# Patient Record
Sex: FEMALE | Race: WHITE | NOT HISPANIC OR LATINO | Employment: FULL TIME | URBAN - METROPOLITAN AREA
[De-identification: names, ages, dates, MRNs, and addresses within clinical notes are randomized per-mention and may not be internally consistent; named-entity substitution may affect disease eponyms.]

---

## 2018-03-07 ENCOUNTER — OFFICE VISIT (OUTPATIENT)
Dept: URGENT CARE | Facility: URGENT CARE | Age: 53
End: 2018-03-07
Payer: COMMERCIAL

## 2018-03-07 VITALS
TEMPERATURE: 98 F | WEIGHT: 172.31 LBS | OXYGEN SATURATION: 99 % | DIASTOLIC BLOOD PRESSURE: 70 MMHG | SYSTOLIC BLOOD PRESSURE: 110 MMHG | HEART RATE: 76 BPM

## 2018-03-07 DIAGNOSIS — S46.811A STRAIN OF RIGHT TRAPEZIUS MUSCLE, INITIAL ENCOUNTER: Primary | ICD-10-CM

## 2018-03-07 PROCEDURE — 99203 OFFICE O/P NEW LOW 30 MIN: CPT | Performed by: FAMILY MEDICINE

## 2018-03-07 RX ORDER — LIDOCAINE 50 MG/G
PATCH TOPICAL
Qty: 30 PATCH | Refills: 0 | Status: SHIPPED | OUTPATIENT
Start: 2018-03-07 | End: 2020-05-25

## 2018-03-07 NOTE — LETTER
[facility]  [address] [address]  [phone]          March 7, 2018    RE:  Silvia Humphreys                                                                                                                                                       [address] [address]        To whom it may concern:    Silvia Humphreys was seen at [facility] for  Strain of right trapezius muscle, initial encounter She  may return to work with the following restrictions:  1. No lifting for 1 week (until 3/15/18)   2. No shoveling snow indefinitely     Sincerely,      Polina Wheatley MD

## 2018-03-07 NOTE — MR AVS SNAPSHOT
After Visit Summary   3/7/2018    Silvia Humphreys    MRN: [id number]           Patient Information     Date Of Birth          1965        Visit Information        Provider Department      3/7/2018 7:35 PM Polina Wheatley MD [facility]        Today's Diagnoses     Strain of right trapezius muscle, initial encounter    -  1      Care Instructions    -Lidoderm patch for back pain  -Continue diclofenac and flexeril    Muscle Strain in the Extremities  A muscle strain is a stretching and tearing of muscle fibers. This causes pain, especially when you move that muscle. There may also be some swelling and bruising.  Home care    Keep the hurt area raised to reduce pain and swelling. This is especially important during the first 48 hours.    Apply an ice pack over the injured area for 15 to 20 minutes every 3 to 6 hours. You should do this for the first 24 to 48 hours. You can make an ice pack by filling a plastic bag that seals at the top with ice cubes and then wrapping it with a thin towel. Be careful not to injure your skin with the ice treatments. Ice should never be applied directly to skin. Continue the use of ice packs for relief of pain and swelling as needed. After 48 hours, apply heat (warm shower or warm bath) for 15 to 20 minutes several times a day, or alternate ice and heat.    You may use over-the-counter pain medicine to control pain, unless another medicine was prescribed. If you have chronic liver or kidney disease or ever had a stomach ulcer or GI bleeding, talk with your healthcare provider before using these medicines.    For leg strains: If crutches have been recommended, don t put full weight on the hurt leg until you can do so without pain. You can return to sports when you are able to hop and run on the injured leg without pain.  Follow-up care  Follow up with your healthcare provider, or as advised.  When to seek medical advice  Call your  healthcare provider right away if any of these occur:    The toes of the injured leg become swollen, cold, blue, numb, or tingly    Pain or swelling increases  Date Last Reviewed: 11/19/2015 2000-2017 [employer]. [address] [place] [address], [place] [address]. All rights reserved. This information is not intended as a substitute for professional medical care. Always follow your healthcare professional's instructions.        Self-Care for Strains and Sprains  Most minor strains and sprains can be treated with self-care. Recovering from a strain or sprain may take 6 to 8 weeks. Your self-care goal is to reduce pain and immobilize the injury to speed healing.     A sprain injures ligaments (tissue that connects bones to bones).        A strain injures muscles or tendons (tissue that connects muscles to bones).   Support the injured area  Wrapping the injured area provides support for short, necessary activities. Be careful not to wrap the area too tightly. This could cut off the blood supply.    Support a wrist, elbow, or shoulder with a sling.    Wrap an ankle or knee with an elastic bandage.    Tape a finger or toe to the one next to it.  Use cold and heat  Cold reduces swelling. Both cold and heat reduce pain. Heat should not be used in the initial treatment of the injury. When using cold or heat, always place a towel between the pack and your skin.    Apply ice or a cold pack 10 to 15 minutes every hour you re awake for the first 2 days.    After the swelling goes down, use cold or heat to control pain. Don t use heat late in the day, since it can cause swelling when you re not active.  Rest and elevate  Rest and elevation help your injury heal faster.    Raise the injured area above your heart level.    Keep the injured area from moving.    Limit the use of the joint or limb.  Use medicine    Aspirin reduces pain and swelling. (Note: Don t give aspirin to a child 18 or younger unless prescribed by the  doctor.)    Aspirin substitutes, such as ibuprofen, can reduce pain. Some substitutes reduce swelling, too. Ask your pharmacist which substitutes you can use.  Call your doctor if:    The injured joint won t move, or bones make a grating sound when they move.    You can t put weight on the injured area, even after 24 hours.    The injured body part is cold, blue, or numb.    The joint or limb appears bent or crooked.    Pain increases or doesn t improve in 4 days.    When pressing along the injured area, you notice a spot that is especially painful.   Date Last Reviewed: 9/29/2015 2000-2017 [employer]. [address] [place] [address]. All rights reserved. This information is not intended as a substitute for professional medical care. Always follow your healthcare professional's instructions.                Follow-ups after your visit        Follow-up notes from your care team     Return if symptoms worsen or fail to improve.      Who to contact     If you have questions or need follow up information about today's clinic visit or your schedule please contact [facility] directly at [phone].  Normal or non-critical lab and imaging results will be communicated to you by Rayneerhart, letter or phone within 4 business days after the clinic has received the results. If you do not hear from us within 7 days, please contact the clinic through Rayneerhart or phone. If you have a critical or abnormal lab result, we will notify you by phone as soon as possible.  Submit refill requests through CloudJay or call your pharmacy and they will forward the refill request to us. Please allow 3 business days for your refill to be completed.          Additional Information About Your Visit        CloudJay Information     CloudJay lets you send messages to your doctor, view your test results, renew your prescriptions, schedule appointments and more. To sign up, go to  "[email or web address] . Click on \"Log in\" on the left side of the screen, which will take you to the Welcome page. Then click on \"Sign up Now\" on the right side of the page.     You will be asked to enter the access code listed below, as well as some personal information. Please follow the directions to create your username and password.     Your access code is: [email or web address]  Expires: 2018  8:27 PM     Your access code will  in 90 days. If you need help or a new code, please call your [facility] or [phone].        Care EveryWhere ID     This is your Care EveryWhere ID. This could be used by other organizations to access your [facility] medical records  [id number]        Your Vitals Were     Pulse Temperature Pulse Oximetry             76 98  F (36.7  C) (Tympanic) 99%          Blood Pressure from Last 3 Encounters:   18 110/70    Weight from Last 3 Encounters:   18 172 lb 5 oz (78.2 kg)              Today, you had the following     No orders found for display         Today's Medication Changes          These changes are accurate as of 3/7/18  8:27 PM.  If you have any questions, ask your nurse or doctor.               Start taking these medicines.        Dose/Directions    lidocaine 5 % Patch   Commonly known as:  LIDODERM   Used for:  Strain of right trapezius muscle, initial encounter        Apply up to 3 patches to painful area at once for up to 12 h within a 24 h period.  Remove after 12 hours.   Quantity:  30 patch   Refills:  0            Where to get your medicines      These medications were sent to [facility] - [place], [place] - [address] AT [facility] [address], [facility] [address]    Hours:  24-hours Phone:  [phone]     lidocaine 5 % Patch                Primary Care Provider Office Phone # Fax #    Veda Godfrey [phone]       [facility] [address]  [place] [address]      "   Equal Access to Services     [facility] : Hadii moi patel ceci Salinasali, waletyda luqadaha, qaleonilata kaverocarl gay. So [facility] [phone].    ATENCIÓN: Si habla español, tiene a pearce disposición servicios gratuitos de asistencia lingüística. Korinaame al [phone].    We comply with applicable federal civil rights laws and [place] laws. We do not discriminate on the basis of race, color, national origin, age, disability, sex, sexual orientation, or gender identity.            Thank you!     Thank you for choosing [facility]  for your care. Our goal is always to provide you with excellent care. Hearing back from our patients is one way we can continue to improve our services. Please take a few minutes to complete the written survey that you may receive in the mail after your visit with us. Thank you!             Your Updated Medication List - Protect others around you: Learn how to safely use, store and throw away your medicines at www.disposemymeds.org.          This list is accurate as of 3/7/18  8:27 PM.  Always use your most recent med list.                   Brand Name Dispense Instructions for use Diagnosis    celeXA 40 MG tablet   Generic drug:  citalopram      Take 40 mg by mouth daily.        CYCLOBENZAPRINE HCL PO      Take 10 mg by mouth 2 times daily as needed for muscle spasms        DICLOFENAC PO      Take 75 mg by mouth 2 times daily        levothyroxine 50 MCG tablet    SYNTHROID/LEVOTHROID     Take 50 mcg by mouth daily.        lidocaine 5 % Patch    LIDODERM    30 patch    Apply up to 3 patches to painful area at once for up to 12 h within a 24 h period.  Remove after 12 hours.    Strain of right trapezius muscle, initial encounter

## 2018-03-08 NOTE — PROGRESS NOTES
[facility]  [address] [place] [address]  [phone]         SUBJECTIVE       Silvia Humphreys is a 52 year old  female with a PMH significant for:   There is no problem list on file for this patient.    She presents with right shoulder and upper back pain.    She had pain starting 1.5 weeks ago after she did heavy shoveling of the snow. Otherwise, she can't remember a specific inciting event. No fall or direct injury.  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 She also did a lot of shoveling after the heavy snow at the group home.     The pain started getting worse the last few days and did a lot of activity at work so she went to her primary care clinic in [place] yesterday. She was prescribed flexeril and diclofenac with limited relief. The pain feels on her right upper shoulder and right upper back and front of chest. It feels achy, burning always there. Movement aggravates it. Lying down helps. She has tried ice and heat. No numbness, tingling or weakness. No dropping objects of extremity weakness. No other areas of pain. No fevers, vision problems, or headaches.    Past Medical History:  No past medical history on file.    Past Surgical History:  No past surgical history on file.    Family History:  No family history on file.    Social History:  Social History     Social History     Marital status: Single     Spouse name: N/A     Number of children: N/A     Years of education: N/A     Occupational History     Not on file.     Social History Main Topics     Smoking status: Current Every Day Smoker     Smokeless tobacco: Not on file     Alcohol use No     Drug use: Not on file     Sexual activity: Not on file     Other Topics Concern     Not on file     Social History Narrative     No narrative on file       Medications:   Current Outpatient Prescriptions   Medication Sig Dispense Refill      DICLOFENAC PO Take 75 mg by mouth 2 times daily       CYCLOBENZAPRINE HCL PO Take 10 mg by mouth 2 times daily as needed for muscle spasms       lidocaine (LIDODERM) 5 % Patch Apply up to 3 patches to painful area at once for up to 12 h within a 24 h period.  Remove after 12 hours. 30 patch 0     levothyroxine (SYNTHROID, LEVOTHROID) 50 MCG tablet Take 50 mcg by mouth daily.       citalopram (CELEXA) 40 MG tablet Take 40 mg by mouth daily.         Allergies:     Allergies   Allergen Reactions     Aspirin [Dihydroxyaluminum Aminoacetate]      Augmentin      Latex      No Clinical Screening - See Comments      cantaloupe     Wasps [Hornets]      And bee       PMH, Surgical Hx, Family Hx, Social Hx, Medications and Allergies were reviewed and updated as needed.        REVIEW OF SYSTEMS     Complete ROS negative except as mentioned above        OBJECTIVE     Vitals:    03/07/18 1955 03/07/18 2011   BP: (!) 88/60 110/70   Pulse: 76    Temp: 98  F (36.7  C)    TempSrc: Tympanic    SpO2: 99%    Weight: 172 lb 5 oz (78.2 kg)      There is no height or weight on file to calculate BMI.    Constitutional: Awake, alert, cooperative, no apparent distress, and appears stated age.  Neck: Supple, symmetrical, trachea midline, no adenopathy, thyroid symmetric, not enlarged and no tenderness, skin normal. No spinous process tenderness. Able to flex, extend, turn head right left but noted achiness with movement.   Hematologic / Lymphatic: No cervical lymphadenopathy and no supraclavicular lymphadenopathy.  Back: Symmetric, no curvature, spinous processes are non-tender on palpation, paraspinous muscles are non-tender on palpation except along the upper border of right trapezius muscle,, no costal vertebral tenderness.  Lungs: No increased work of breathing, good air exchange, clear to auscultation bilaterally, no crackles or wheezing.  Cardiovascular: Regular rate and rhythm, normal S1 and S2, no S3 or S4, and no murmur noted. Radial  pulses 2+  Musculoskeletal: No redness, warmth, or swelling of the joints. Arms symmetric.  Full range of motion noted including abduction, adduction, external rotation, and internal rotation of upper extremity. She notes generalized discomfort with any right arm movement along shoulder. Negative Neer's and Valentin.   Motor strength is 5 out of 5 all extremities bilaterally.  Tone is normal.  Neurologic: Awake, alert, oriented to name, place and time. .  Motor is 5 out of 5 bilaterally.  Sensation intact.   Neuropsychiatric: Normal affect, mood, orientation, memory and insight.  Skin: No rashes, erythema, pallor, petechia or purpura.      ASSESSMENT AND PLAN     Silvia Humphreys is a 52 year old  female who presents for right shoulder and upper back pain.     Strain of right trapezius muscle: Suspect secondary to overuse from heavy snow shoveling. Neurovascularly intact with no focal bony tenderness. Will continue treatment as prescribed by her PCP with NSAID, muscle relaxant, and add on topical agent. Return precautions discussed with patient. After patient left at the end of clinic, received phone call from pharmacy that lidoderm patch is not covered by her insurance so instruction given to pharmacy if she cannot afford to pay out of pocket, acceptable alternatives are salonpas or capsaicin cream.   -Continue diclofenac and flexeril as prescribed by PCP  -Heat and ice  -Work letter provided  - lidocaine (LIDODERM) 5 % Patch; Apply up to 3 patches to painful area at once for up to 12 h within a 24 h period.  Remove after 12 hours.  Dispense: 30 patch; Refill: 0    RTC  as needed if develops new or worsening symptoms.    Options for treatment and/or follow-up care were reviewed with the patient who was engaged and actively involved in the decision making process and verbalized understanding of the options discussed and was satisfied with the final plan.    Polina Wheatley MD PGY-3  Pager: [phone]

## 2018-03-08 NOTE — PATIENT INSTRUCTIONS
-Lidoderm patch for back pain  -Continue diclofenac and flexeril    Muscle Strain in the Extremities  A muscle strain is a stretching and tearing of muscle fibers. This causes pain, especially when you move that muscle. There may also be some swelling and bruising.  Home care    Keep the hurt area raised to reduce pain and swelling. This is especially important during the first 48 hours.    Apply an ice pack over the injured area for 15 to 20 minutes every 3 to 6 hours. You should do this for the first 24 to 48 hours. You can make an ice pack by filling a plastic bag that seals at the top with ice cubes and then wrapping it with a thin towel. Be careful not to injure your skin with the ice treatments. Ice should never be applied directly to skin. Continue the use of ice packs for relief of pain and swelling as needed. After 48 hours, apply heat (warm shower or warm bath) for 15 to 20 minutes several times a day, or alternate ice and heat.    You may use over-the-counter pain medicine to control pain, unless another medicine was prescribed. If you have chronic liver or kidney disease or ever had a stomach ulcer or GI bleeding, talk with your healthcare provider before using these medicines.    For leg strains: If crutches have been recommended, don t put full weight on the hurt leg until you can do so without pain. You can return to sports when you are able to hop and run on the injured leg without pain.  Follow-up care  Follow up with your healthcare provider, or as advised.  When to seek medical advice  Call your healthcare provider right away if any of these occur:    The toes of the injured leg become swollen, cold, blue, numb, or tingly    Pain or swelling increases  Date Last Reviewed: 11/19/2015 2000-2017 [employer]. [address] [place] [address], [place] [address]. All rights reserved. This information is not intended as a substitute for professional medical care. Always follow your healthcare  professional's instructions.        Self-Care for Strains and Sprains  Most minor strains and sprains can be treated with self-care. Recovering from a strain or sprain may take 6 to 8 weeks. Your self-care goal is to reduce pain and immobilize the injury to speed healing.     A sprain injures ligaments (tissue that connects bones to bones).        A strain injures muscles or tendons (tissue that connects muscles to bones).   Support the injured area  Wrapping the injured area provides support for short, necessary activities. Be careful not to wrap the area too tightly. This could cut off the blood supply.    Support a wrist, elbow, or shoulder with a sling.    Wrap an ankle or knee with an elastic bandage.    Tape a finger or toe to the one next to it.  Use cold and heat  Cold reduces swelling. Both cold and heat reduce pain. Heat should not be used in the initial treatment of the injury. When using cold or heat, always place a towel between the pack and your skin.    Apply ice or a cold pack 10 to 15 minutes every hour you re awake for the first 2 days.    After the swelling goes down, use cold or heat to control pain. Don t use heat late in the day, since it can cause swelling when you re not active.  Rest and elevate  Rest and elevation help your injury heal faster.    Raise the injured area above your heart level.    Keep the injured area from moving.    Limit the use of the joint or limb.  Use medicine    Aspirin reduces pain and swelling. (Note: Don t give aspirin to a child 18 or younger unless prescribed by the doctor.)    Aspirin substitutes, such as ibuprofen, can reduce pain. Some substitutes reduce swelling, too. Ask your pharmacist which substitutes you can use.  Call your doctor if:    The injured joint won t move, or bones make a grating sound when they move.    You can t put weight on the injured area, even after 24 hours.    The injured body part is cold, blue, or numb.    The joint or limb appears  bent or crooked.    Pain increases or doesn t improve in 4 days.    When pressing along the injured area, you notice a spot that is especially painful.   Date Last Reviewed: 9/29/2015 2000-2017 [employer]. [address] [place] [address], [place] [address]. All rights reserved. This information is not intended as a substitute for professional medical care. Always follow your healthcare professional's instructions.

## 2019-12-18 ENCOUNTER — OFFICE VISIT (OUTPATIENT)
Dept: FAMILY MEDICINE | Facility: CLINIC | Age: 54
End: 2019-12-18
Payer: COMMERCIAL

## 2019-12-18 VITALS
SYSTOLIC BLOOD PRESSURE: 101 MMHG | TEMPERATURE: 97.6 F | DIASTOLIC BLOOD PRESSURE: 68 MMHG | BODY MASS INDEX: 32.27 KG/M2 | HEIGHT: 64 IN | HEART RATE: 102 BPM | OXYGEN SATURATION: 96 % | WEIGHT: 189 LBS

## 2019-12-18 DIAGNOSIS — J01.10 ACUTE NON-RECURRENT FRONTAL SINUSITIS: Primary | ICD-10-CM

## 2019-12-18 PROCEDURE — 99213 OFFICE O/P EST LOW 20 MIN: CPT | Performed by: PHYSICIAN ASSISTANT

## 2019-12-18 RX ORDER — AZITHROMYCIN 250 MG/1
250 TABLET, FILM COATED ORAL DAILY
Qty: 6 TABLET | Refills: 0 | Status: SHIPPED | OUTPATIENT
Start: 2019-12-18 | End: 2020-05-25

## 2019-12-18 RX ORDER — GUAIFENESIN/DEXTROMETHORPHAN 100-10MG/5
5 SYRUP ORAL 4 TIMES DAILY PRN
Qty: 236 ML | Refills: 0 | Status: SHIPPED | OUTPATIENT
Start: 2019-12-18 | End: 2020-05-25

## 2019-12-18 ASSESSMENT — MIFFLIN-ST. JEOR: SCORE: 1437.3

## 2019-12-19 NOTE — PROGRESS NOTES
SUBJECTIVE:   Tori Humphreys is a 54 year old female presenting with a chief complaint of runny nose, stuffy nose, cough - productive, shortness of breath, sore throat and facial pain/pressure.  Onset of symptoms was 10 day(s) ago.  Course of illness is worsening.    Severity moderate  Treatment measures tried include Tylenol/Ibuprofen, Decongestants, Fluids and Rest.  Predisposing factors include None and seasonal allergies.    No past medical history on file.  Current Outpatient Medications   Medication Sig Dispense Refill     citalopram (CELEXA) 40 MG tablet Take 40 mg by mouth daily.       CYCLOBENZAPRINE HCL PO Take 10 mg by mouth 2 times daily as needed for muscle spasms       DICLOFENAC PO Take 75 mg by mouth 2 times daily       levothyroxine (SYNTHROID, LEVOTHROID) 50 MCG tablet Take 50 mcg by mouth daily.       lidocaine (LIDODERM) 5 % Patch Apply up to 3 patches to painful area at once for up to 12 h within a 24 h period.  Remove after 12 hours. 30 patch 0     Social History     Tobacco Use     Smoking status: Current Every Day Smoker   Substance Use Topics     Alcohol use: No       ROS:  CONSTITUTIONAL:NEGATIVE for fever, chills, change in weight  INTEGUMENTARY/SKIN: NEGATIVE for worrisome rashes, moles or lesions  EYES: NEGATIVE for vision changes or irritation  ENT/MOUTH: See HPI  RESP:NEGATIVE for significant SOB. Positive for cough.  CV: NEGATIVE for chest pain, palpitations or peripheral edema  GI: NEGATIVE for nausea, abdominal pain, heartburn, or change in bowel habits  HEME/ALLERGY/IMMUNE: NEGATIVE for bleeding problems  PSYCHIATRIC: NEGATIVE for changes in mood or affect    OBJECTIVE:  There were no vitals taken for this visit.  GENERAL APPEARANCE: healthy, alert and no distress  EYES: EOMI,  PERRL, conjunctiva clear  HENT: ear canals and TM's normal.  Mouth without ulcers, erythema or lesions. Nasal mucosa is erythematous and with moderate green mucus. Frontal sinuses are moderately tender.    NECK: supple, nontender, no lymphadenopathy  RESP: lungs clear to auscultation - no rales, rhonchi or wheezes  CV: regular rates and rhythm, normal S1 S2, no murmur noted  ABDOMEN:  soft, nontender, no HSM or masses and bowel sounds normal  NEURO: Normal strength and tone, sensory exam grossly normal,  normal speech and mentation  SKIN: no suspicious lesions or rashes    ASSESSMENT/PLAN:    (J01.10) Acute non-recurrent frontal sinusitis  (primary encounter diagnosis)  Plan: azithromycin (ZITHROMAX) 250 MG tablet,         guaiFENesin-dextromethorphan (ROBITUSSIN DM)         100-10 MG/5ML syrup    Age 6-12:   Okay to use Children Motrin OTC    Adults:  Okay to take acetaminophen 500 mg- 2 tabs (Total of 1000 mg) every 8 hrs   Okay to take ibuprofen 200 mg- 3 tabs (Total of 600 mg) every 6 hours        Okay to use Neti pot for sinus lavage up to three times daily for congestion and sinus pressure. Daily hot shower can be beneficial for congestion and body aches. Okay to use bedroom vaporizer or humidifier if symptoms are worse at night. Nightly Vicks Vapor rub okay to use for sleep.     OTC cough medication and decongestants okay if not prescribed by me during this visit.       Okay to use salt water gargles and lozenges for throat discomfort.     Patient will need to get plenty of rest and drink at least 1.5-2 liters of fluids daily for adults and 1-1.5 liters for children. If vomiting and not tolerating liquids for more than 24 hrs, please go to your nearest emergency department for IV fluids and further treatment.     Patient is not contagious after 1 week from start of symptoms. If possible, wear mask for first 7 days. Wash hands regularly and vigorously for 30 seconds often.     Patient was advised to return to clinic if symptoms do not improve in the amount of time specified in the AVS or if symptoms worsen. Patient educated on red flag symptoms and asked to go directly to the ED if symptoms present themselves.        Rajan Palacio PA-C on 12/18/2019 at 8:01 PM

## 2020-05-03 ENCOUNTER — VIRTUAL VISIT (OUTPATIENT)
Dept: FAMILY MEDICINE | Facility: OTHER | Age: 55
End: 2020-05-03

## 2020-05-04 NOTE — PROGRESS NOTES
"Date: 2020 20:14:31  Clinician: Heavenly Gordon  Clinician NPI: [id number]  Patient: Tori Humphreys  Patient : 1965  Patient Address: [address] [place] [address]  Patient Phone: [phone]  Visit Protocol: URI  Patient Summary:  Tori is a 54 year old ( : 1965 ) female who initiated a Visit for COVID-19 (Coronavirus) evaluation and screening. When asked the question \"Please sign me up to receive news, health information and promotions from [employer].\", Tori responded \"Yes\".    Her symptoms consist of myalgia and ear pain. She is experiencing difficulty breathing due to nasal congestion but she is not short of breath.   Tori denies having fever, rhinitis, facial pain or pressure, sore throat, cough, nasal congestion, vomiting, nausea, teeth pain, ageusia, anosmia, diarrhea, headache, malaise, wheezing, and chills. She also denies taking antibiotic medication for the symptoms and having recent facial or sinus surgery in the past 60 days.   Precipitating events  She has recently been exposed to someone with influenza. Tori has been in close contact with the following high risk individuals: adults 65 or older, people with asthma, heart disease or diabetes, pregnant women, and immunocompromised people.   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 She provides direct patient care. 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   Tori has had a close contact with a laboratory-confirmed COVID-19 patient within 14 days of symptom onset. She also has had a close contact with a suspected COVID-19 patient within 14 days of symptom onset. She was exposed at her work. Additional information about contact with COVID-19 " (Coronavirus) patient as reported by the patient (free text): Co-worker 5 days,  and also patient 3 days   Pertinent medical history  Tori does not get yeast infections when she takes antibiotics.   Tori needs a return to work/school note.   Weight: 170 lbs   Tori does not smoke or use smokeless tobacco.   Additional information as reported by the patient (free text): My sister runs the group home I work at and she is in [facility] with Covid 19 and all the 4 women I work with are exposed, 1 already has just been diagnosed as well. I need to be tested as I work with vulnerable disabled adults and we have all been exposed from my sister.   Weight: 170 lbs  A synchronous phone visit was initiated by the provider for the following reason: Call to clarify duration    MEDICATIONS: cyclobenzaprine oral, pramipexole oral, Celexa oral, ibuprofen-oxycodone oral, levothyroxine oral, ALLERGIES: Augmentin, Latex, Natural Rubber, cortisone  Clinician Response:  Dear Tori,      Your symptoms show that you may have coronavirus (COVID-19). This illness can cause fever, cough and trouble breathing. Many people get a mild case and get better on their own. Some people can get very sick.   Will I be tested for COVID-19?  We would recommend you be tested for coronavirus. Here is how to get that scheduled:   Call [phone]. Tell them you were referred by [facility] to have a COVID-19 test. You will be scheduled at one of our [facility] testing locations (drive-up). Please have your [facility] visit information ready when you call including your visit ID number to verify you were referred.    How can I protect others in the meantime?  First, stay home and away from others (self-isolate) until:   You've had no fever---and no medicine that reduces fever---for 3 full days (72 hours). And...    Your other symptoms have gotten better. For example, your cough or breathing has improved. And...    At least 7 days have passed since your symptoms  started.   During this time:   Don't go to work, school or anywhere else.     Stay away from others in your home. No hugging, kissing or shaking hands.    Don't let anyone visit.    Cover your mouth and nose with a mask, tissue or wash cloth to avoid spreading germs.    Wash your hands and face often. Use soap and water.   How can I take care of myself?  1.Take Tylenol (acetaminophen) for fever or pain. If you have liver or kidney problems, ask your family doctor if it's okay to take Tylenol.   Adults can take either:    650 mg (two 325 mg pills) every 4 to 6 hours, or...    1,000 mg (two 500 mg pills) every 8 hours as needed.     Note: Don't take more than 3,000 mg in one day.   For children, check the Tylenol bottle for the right dose. The dose is based on the child's age or weight.  2.If you have other health problems (like cancer, heart failure, an organ transplant or severe kidney disease): Call your specialty clinic if you don't feel better in the next 2 days.  3.Know when to call 911: If your breathing is so bad that it keeps you from doing normal activities, call 911 or go to the emergency room. Tell them that you've been staying home and may have COVID-19.  4.Sign up for [employer]. We know it's scary to hear that you might have COVID-19. We want to track your symptoms to make sure you're okay over the next 2 weeks. Please look for an email from [employer]---this is a free, online program that we'll use to keep in touch. To sign up, follow the link in the email. Learn more at [email or web address].  Where can I get more information?  To learn more about COVID-19 and how to care for yourself at home, please visit the CDC website at https://www.cdc.gov/coronavirus/2019-ncov/about/steps-when-sick.html.  For more about your care at [facility], please visit [email or web address]     Diagnosis: Cough  Diagnosis ICD: R05  Triage Notes: I reviewed the patient's history,  verified their identity, and explained the Visit process.    Spoke with patient. Symptoms of cough aches 1 week. No fever, mild sob. Speaks in full sentences   Cough is same.  Synchronous Triage: phone, status: completed, duration: 265 seconds

## 2020-05-25 ENCOUNTER — OFFICE VISIT (OUTPATIENT)
Dept: URGENT CARE | Facility: URGENT CARE | Age: 55
End: 2020-05-25
Payer: COMMERCIAL

## 2020-05-25 VITALS — SYSTOLIC BLOOD PRESSURE: 127 MMHG | DIASTOLIC BLOOD PRESSURE: 77 MMHG | TEMPERATURE: 98.6 F | HEART RATE: 76 BPM

## 2020-05-25 DIAGNOSIS — N39.0 ACUTE UTI: Primary | ICD-10-CM

## 2020-05-25 DIAGNOSIS — R30.0 DYSURIA: ICD-10-CM

## 2020-05-25 LAB
ALBUMIN UR-MCNC: NEGATIVE MG/DL
APPEARANCE UR: CLEAR
BILIRUB UR QL STRIP: NEGATIVE
COLOR UR AUTO: YELLOW
GLUCOSE UR STRIP-MCNC: NEGATIVE MG/DL
HGB UR QL STRIP: NEGATIVE
KETONES UR STRIP-MCNC: NEGATIVE MG/DL
LEUKOCYTE ESTERASE UR QL STRIP: NEGATIVE
NITRATE UR QL: NEGATIVE
NON-SQ EPI CELLS #/AREA URNS LPF: NORMAL /LPF
PH UR STRIP: 5 PH (ref 5–7)
RBC #/AREA URNS AUTO: NORMAL /HPF
SOURCE: NORMAL
SP GR UR STRIP: 1.01 (ref 1–1.03)
SPECIMEN SOURCE: NORMAL
UROBILINOGEN UR STRIP-ACNC: 0.2 EU/DL (ref 0.2–1)
WBC #/AREA URNS AUTO: NORMAL /HPF
WET PREP SPEC: NORMAL

## 2020-05-25 PROCEDURE — 87210 SMEAR WET MOUNT SALINE/INK: CPT | Performed by: PHYSICIAN ASSISTANT

## 2020-05-25 PROCEDURE — 81001 URINALYSIS AUTO W/SCOPE: CPT | Performed by: FAMILY MEDICINE

## 2020-05-25 PROCEDURE — 99213 OFFICE O/P EST LOW 20 MIN: CPT | Performed by: PHYSICIAN ASSISTANT

## 2020-05-25 RX ORDER — SULFAMETHOXAZOLE/TRIMETHOPRIM 800-160 MG
1 TABLET ORAL 2 TIMES DAILY
Qty: 14 TABLET | Refills: 0 | Status: SHIPPED | OUTPATIENT
Start: 2020-05-25 | End: 2020-06-01

## 2020-05-25 NOTE — PROGRESS NOTES
Patient presents with:  Urgent Care: RUQ abdominal pain radiating to back, burning pain when urinating and urinary frequency for over one week.     SUBJECTIVE:   Tori Humphreys is a 54 year old female who  presents today for a possible UTI. Symptoms of dysuria, urgency and frequency have been going on for 1week(s).  Some urgency.  Right sided mid and low back discomfort as well.  Has also noticed some right mid lateral abdomen discomfort.  Denies any nausea or vomiting.  Appetite is normal.   Hematuria no.  gradual onset and moderate.  There is no history of fever, chills, nausea or vomiting.  No history of vaginal or penile discharge. This patient does have a history of urinary tract infections.  Patient denies long duration, rigors, flank pain, temperature > 101 degrees F. 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      Took pyridium and is drinking     No past medical history on file.  There is no problem list on file for this patient.    Social History     Tobacco Use     Smoking status: Current Every Day Smoker   Substance Use Topics     Alcohol use: No       ROS:   CONSTITUTIONAL:NEGATIVE for fever, chills, change in weight  INTEGUMENTARY/SKIN: NEGATIVE for worrisome rashes, moles or lesions  EYES: NEGATIVE for vision changes or irritation  ENT/MOUTH: NEGATIVE for ear, mouth and throat problems  RESP:NEGATIVE for significant cough or SOB  CV: NEGATIVE for chest pain, palpitations or peripheral edema  GI: NEGATIVE for nausea, heartburn, or change in bowel habits  : as per HPI  MUSCULOSKELETAL: NEGATIVE for significant arthralgias or myalgia  NEURO: NEGATIVE for weakness, dizziness or paresthesias  ENDOCRINE: NEGATIVE for temperature intolerance, skin/hair changes  Review of systems negative except as stated above.    OBJECTIVE:  /77   Pulse 76   Temp 98.6  F (37  C) (Tympanic)   GENERAL APPEARANCE: healthy, alert and  no distress  RESP: lungs clear to auscultation - no rales, rhonchi or wheezes  CV: regular rates and rhythm, normal S1 S2, no murmur noted  ABDOMEN:  soft, nontender, no HSM or masses and bowel sounds normal  BACK: No CVA tenderness  SKIN: no suspicious lesions or rashes    Results for orders placed or performed in visit on 05/25/20   UA with Microscopic reflex to Culture     Status: None    Specimen: Midstream Urine   Result Value Ref Range    Color Urine Yellow     Appearance Urine Clear     Glucose Urine Negative NEG^Negative mg/dL    Bilirubin Urine Negative NEG^Negative    Ketones Urine Negative NEG^Negative mg/dL    Specific Gravity Urine 1.010 1.003 - 1.035    pH Urine 5.0 5.0 - 7.0 pH    Protein Albumin Urine Negative NEG^Negative mg/dL    Urobilinogen Urine 0.2 0.2 - 1.0 EU/dL    Nitrite Urine Negative NEG^Negative    Blood Urine Negative NEG^Negative    Leukocyte Esterase Urine Negative NEG^Negative    Source Midstream Urine     WBC Urine 0 - 5 OTO5^0 - 5 /HPF    RBC Urine O - 2 OTO2^O - 2 /HPF    Squamous Epithelial /LPF Urine Few FEW^Few /LPF   Wet prep     Status: None    Specimen: Vagina   Result Value Ref Range    Specimen Description Vagina     Wet Prep No Trichomonas seen     Wet Prep No clue cells seen     Wet Prep No yeast seen     Wet Prep WBC'S seen  Few         (N39.0) Acute UTI  (primary encounter diagnosis)  Comment:   Plan: sulfamethoxazole-trimethoprim (BACTRIM DS)         800-160 MG tablet            (R30.0) Dysuria  Comment:   Plan: UA with Microscopic reflex to Culture, Wet prep

## 2020-05-25 NOTE — PATIENT INSTRUCTIONS
(N39.0) Acute UTI  (primary encounter diagnosis)  Comment:   Plan: sulfamethoxazole-trimethoprim (BACTRIM DS)         800-160 MG tablet            (R30.0) Dysuria  Comment:   Plan: UA with Microscopic reflex to Culture, Wet prep            Follow up with primary clinic should symptoms persist or worsen

## 2021-10-01 ENCOUNTER — ANCILLARY PROCEDURE (OUTPATIENT)
Dept: GENERAL RADIOLOGY | Facility: CLINIC | Age: 56
End: 2021-10-01
Attending: PHYSICIAN ASSISTANT
Payer: COMMERCIAL

## 2021-10-01 ENCOUNTER — OFFICE VISIT (OUTPATIENT)
Dept: URGENT CARE | Facility: URGENT CARE | Age: 56
End: 2021-10-01
Payer: COMMERCIAL

## 2021-10-01 VITALS
OXYGEN SATURATION: 98 % | RESPIRATION RATE: 20 BRPM | HEART RATE: 88 BPM | SYSTOLIC BLOOD PRESSURE: 120 MMHG | DIASTOLIC BLOOD PRESSURE: 80 MMHG

## 2021-10-01 DIAGNOSIS — M25.532 LEFT WRIST PAIN: ICD-10-CM

## 2021-10-01 DIAGNOSIS — M25.532 LEFT WRIST PAIN: Primary | ICD-10-CM

## 2021-10-01 DIAGNOSIS — S69.92XA WRIST INJURY, LEFT, INITIAL ENCOUNTER: ICD-10-CM

## 2021-10-01 PROCEDURE — 99213 OFFICE O/P EST LOW 20 MIN: CPT | Performed by: PHYSICIAN ASSISTANT

## 2021-10-01 PROCEDURE — 73110 X-RAY EXAM OF WRIST: CPT | Mod: LT | Performed by: RADIOLOGY

## 2021-10-01 RX ORDER — HYDROCODONE BITARTRATE AND ACETAMINOPHEN 5; 325 MG/1; MG/1
1 TABLET ORAL EVERY 6 HOURS PRN
Qty: 18 TABLET | Refills: 0 | Status: SHIPPED | OUTPATIENT
Start: 2021-10-01 | End: 2021-10-04

## 2021-10-01 NOTE — LETTER
[facility] [address] [address]  [phone]        2021    REPORT OF WORK ABILITY    PATIENT DATA  Employee Name: Tori Humphreys        : 1965   [id number]  Work related injury: YES  Today's date: 2021  Date of injury: 10/1/2021     PROVIDER EVALUATION: Please fill in as needed.  Please give copy to employee for employer.  1. Diagnosis: left wrist injury, pain  2. Treatment: RICE treatment, medication, bracing  3. Medication: vicodin  NOTE: When ordering a medication, [place] Rules require Work Comp or WC on prescriptions.  4. Return to work date: No work from 10/1-10/11 due to being unable to drive and use either hand.  Follow up with PCP in the next week for recheck.      RESTRICTIONS: Unlimited unless listed.  Restrictions apply to home and leisure also.  If work within restrictions is not available, the employee is totally disabled.  Provider comments:   Medical Examiner: Rajan Aguillon [facility] PA-C          Next appointment: 1 week    CC: Employer, Managed Care Plan/Payor, Patient

## 2021-10-05 NOTE — PROGRESS NOTES
Assessment & Plan     Left wrist pain  Wrist xray Negative for acute findings, read by Rajan MORAES at time of visit.  DME wear brace for stabilization and immobilization  vicodin for pain  - XR Wrist Left G/E 3 Views; Future  - Wrist/Arm/Hand Supplies Order for DME - ONLY FOR DME  - HYDROcodone-acetaminophen (NORCO) 5-325 MG tablet; Take 1 tablet by mouth every 6 hours as needed for pain    Wrist injury, left, initial encounter  RICE treatment: Rest, Ice, compression, elevation   Wear brace  Follow up with PCP   - Wrist/Arm/Hand Supplies Order for DME - ONLY FOR DME  - HYDROcodone-acetaminophen (NORCO) 5-325 MG tablet; Take 1 tablet by mouth every 6 hours as needed for pain    Review of external notes as documented elsewhere in note         Tobacco Cessation:   reports that she has been smoking. She has never used smokeless tobacco.      No follow-ups on file.    Rajan Aguillon PA-C  [facility] BRANDEN Valle is a 55 year old who presents for the following health issues     HPI     Left wrist pain  Tenderness     Review of Systems   Constitutional, HEENT, cardiovascular, pulmonary, gi and gu systems are negative, except as otherwise noted.      Objective    /80   Pulse 88   Resp 20   SpO2 98%   There is no height or weight on file to calculate BMI.  Physical Exam   GENERAL: healthy, alert and no distress  MS: Positive for left wrist tenderness, localzied  SKIN: no suspicious lesions or rashes  NEURO: Normal strength and tone, mentation intact and speech normal  PSYCH: mentation appears normal, affect normal/bright    Wrist xray Negative for acute findings, read by Rajan MORAES at time of visit.

## 2021-10-16 ENCOUNTER — OFFICE VISIT (OUTPATIENT)
Dept: URGENT CARE | Facility: URGENT CARE | Age: 56
End: 2021-10-16
Payer: COMMERCIAL

## 2021-10-16 VITALS
TEMPERATURE: 98.8 F | HEART RATE: 98 BPM | OXYGEN SATURATION: 96 % | DIASTOLIC BLOOD PRESSURE: 78 MMHG | SYSTOLIC BLOOD PRESSURE: 126 MMHG | BODY MASS INDEX: 31.24 KG/M2 | RESPIRATION RATE: 20 BRPM | WEIGHT: 182 LBS

## 2021-10-16 DIAGNOSIS — R05.9 COUGH: ICD-10-CM

## 2021-10-16 DIAGNOSIS — R07.0 THROAT PAIN: ICD-10-CM

## 2021-10-16 DIAGNOSIS — J01.90 ACUTE SINUSITIS WITH SYMPTOMS > 10 DAYS: Primary | ICD-10-CM

## 2021-10-16 LAB
DEPRECATED S PYO AG THROAT QL EIA: NEGATIVE
GROUP A STREP BY PCR: NOT DETECTED
SARS-COV-2 RNA RESP QL NAA+PROBE: NEGATIVE

## 2021-10-16 PROCEDURE — U0005 INFEC AGEN DETEC AMPLI PROBE: HCPCS | Performed by: NURSE PRACTITIONER

## 2021-10-16 PROCEDURE — 87651 STREP A DNA AMP PROBE: CPT | Performed by: NURSE PRACTITIONER

## 2021-10-16 PROCEDURE — U0003 INFECTIOUS AGENT DETECTION BY NUCLEIC ACID (DNA OR RNA); SEVERE ACUTE RESPIRATORY SYNDROME CORONAVIRUS 2 (SARS-COV-2) (CORONAVIRUS DISEASE [COVID-19]), AMPLIFIED PROBE TECHNIQUE, MAKING USE OF HIGH THROUGHPUT TECHNOLOGIES AS DESCRIBED BY CMS-2020-01-R: HCPCS | Performed by: NURSE PRACTITIONER

## 2021-10-16 PROCEDURE — 99214 OFFICE O/P EST MOD 30 MIN: CPT | Performed by: NURSE PRACTITIONER

## 2021-10-16 RX ORDER — DOXYCYCLINE 100 MG/1
100 TABLET ORAL 2 TIMES DAILY
Qty: 20 TABLET | Refills: 0 | Status: SHIPPED | OUTPATIENT
Start: 2021-10-16 | End: 2021-10-26

## 2021-10-16 NOTE — PROGRESS NOTES
Chief Complaint   Patient presents with     Cough     with congestion- sx's since 9/29/2021     Pharyngitis         ICD-10-CM    1. Acute sinusitis with symptoms > 10 days  J01.90 doxycycline monohydrate (ADOXA) 100 MG tablet   2. Cough  R05.9 Symptomatic COVID-19 Virus (Coronavirus) by PCR Nose   3. Throat pain  R07.0 Streptococcus A Rapid Scr w Reflx to PCR - Lab Collect     Group A Streptococcus PCR Throat Swab       Patient has been sick for over 2 weeks will treat with antibiotics have her take decongestants push fluids and rest.  If symptoms do not improve she will return for further assessment.    33 minutes total time spent reviewing patient's chart, completing history and physical exam, establishing plan of care, discharge planning and completing documentation.    Results for orders placed or performed in visit on 10/16/21 (from the past 24 hour(s))   Streptococcus A Rapid Scr w Reflx to PCR - Lab Collect    Specimen: Throat; Swab   Result Value Ref Range    Group A Strep antigen Negative Negative       Subjective     Tori Humphreys is an 55 year old female who presents to clinic today for congestion , cough, headaches, feels hot, sore throat, diarrhea, body aches for 2 weeks. Some symptoms have come and gone, but she still feels quite sick overall.     ROS: 10 point ROS neg other than the symptoms noted above in the HPI.       Objective    /78   Pulse 98   Temp 98.8  F (37.1  C)   Resp 20   Wt 82.6 kg (182 lb)   SpO2 96%   BMI 31.24 kg/m      Physical Exam       GENERAL APPEARANCE: alert and mild distress     EYES: PERRL, EOMI, sclera non-icteric     HENT: ear canals and TM's normal, nose and mouth without ulcers or lesions and maxillary and frontal sinuses are tender     NECK: no adenopathy or asymmetry, thyroid normal to palpation     RESP: lungs clear to auscultation - no rales, rhonchi or wheezes     CV: regular rates and rhythm, no murmurs, rubs, or gallop     MS: extremities normal- no  gross deformities noted; normal muscle tone.     SKIN: no suspicious lesions or rashes    Patient Instructions   Use Netti pot twice a day for the next 3-5 days. Use distilled water and the packets of powder included with the pot.    Take Sudafed (Pseudoephredine) 30mg every 6 hours while awake for the next 3-5 days. Do not take this if you have a history of high blood pressure or take medications for high blood pressure.    Take Tylenol or Ibuprofen as needed for fever or pain.    Drink Plenty of water and rest.        LUISA Elmore, CNP  [place] [facility] Provider

## 2022-01-25 ENCOUNTER — OFFICE VISIT (OUTPATIENT)
Dept: URGENT CARE | Facility: URGENT CARE | Age: 57
End: 2022-01-25
Payer: COMMERCIAL

## 2022-01-25 VITALS
DIASTOLIC BLOOD PRESSURE: 86 MMHG | WEIGHT: 190 LBS | HEART RATE: 99 BPM | BODY MASS INDEX: 32.61 KG/M2 | OXYGEN SATURATION: 98 % | SYSTOLIC BLOOD PRESSURE: 157 MMHG

## 2022-01-25 DIAGNOSIS — H10.13 ALLERGIC CONJUNCTIVITIS, BILATERAL: Primary | ICD-10-CM

## 2022-01-25 PROCEDURE — 99213 OFFICE O/P EST LOW 20 MIN: CPT | Performed by: PHYSICIAN ASSISTANT

## 2022-01-25 NOTE — PATIENT INSTRUCTIONS
Patient was educated on the natural course of condition  Conservative measures discussed including continue over-the-counter antihistamines (Zyrtec or Allegra). See your primary care provider if symptoms worsen or do not improve in 7 days. Seek emergency care if you develop difficulty swallowing or difficulty breathing. .

## 2022-01-25 NOTE — PROGRESS NOTES
URGENT CARE VISIT:    SUBJECTIVE:   Tori Humphreys is a 56 year old female who presents complaining of moderate both eyes burning, watery, itching for 1 month(s).  Onset/timing was gradual. Associated signs and symptoms include runny nose. She denies vision changes, headache, sore throat, dry cough, fever, chills and sinus and nasal congestion. She has tried Visine and antibiotic eye drops with no relief of symptoms.  Patient does not wear contacts. She has history of seasonal allergies.     PMH: History reviewed. No pertinent past medical history.  Allergies: Aspirin [dihydroxyaluminum aminoacetate], Augmentin, Latex, Unknown [no clinical screening - see comments], and Wasps [hornets]  Medications:   Current Outpatient Medications   Medication Sig Dispense Refill     citalopram (CELEXA) 40 MG tablet Take 40 mg by mouth daily.       ketotifen (ZADITOR) 0.025 % ophthalmic solution Place 1 drop into both eyes 2 times daily 5 mL 0     levothyroxine (SYNTHROID, LEVOTHROID) 50 MCG tablet Take 50 mcg by mouth daily.       Social History:   Social History     Socioeconomic History     Marital status:      Spouse name: Not on file     Number of children: Not on file     Years of education: Not on file     Highest education level: Not on file   Occupational History     Not on file   Tobacco Use     Smoking status: Current Every Day Smoker     Smokeless tobacco: Never Used   Substance and Sexual Activity     Alcohol use: No     Drug use: Not on file     Sexual activity: Not on file   Other Topics Concern     Not on file   Social History Narrative     Not on file     Social Determinants of Health     Financial Resource Strain: Not on file   Food Insecurity: Not on file   Transportation Needs: Not on file   Physical Activity: Not on file   Stress: Not on file   Social Connections: Not on file   Intimate Partner Violence: Not on file   Housing Stability: Not on file       ROS: ROS otherwise found to be negative except as  noted above.    OBJECTIVE:  BP (!) 157/86 (BP Location: Right arm, Patient Position: Sitting, Cuff Size: Adult Regular)   Pulse 99   Wt 86.2 kg (190 lb)   SpO2 98%   BMI 32.61 kg/m    General: WDWN in NAD.   Head: normocephalic, atraumatic   Eyes: bilateral mild conjunctival injection. Eyes are watery.  Nose: No rhinorrhea.  Ears: Bilateral TMs are easily visualized without erythema, injection, or effusion. Scarring present on right TM. Fluid behind Left TM. No erythema or edema of external canals.   Oropharynx: Mildly erythematous oropharynx. No posterior pharyngeal erythema or exudate.  No oral lesions.  Uvula is midline without erythema or edema.  Cardiac: RRR without murmurs, rubs, or gallops.  Respiratory: LCTAB without adventitious sounds. Non-labored breathing.  Lymph nodes: no cervical adenopathy.  Skin: no rashes or lesions    ASSESSMENT:     ICD-10-CM    1. Allergic conjunctivitis, bilateral  H10.13 ketotifen (ZADITOR) 0.025 % ophthalmic solution        PLAN:  Patient Instructions   Patient was educated on the natural course of condition  Conservative measures discussed including continue over-the-counter antihistamines (Zyrtec or Allegra). See your primary care provider if symptoms worsen or do not improve in 7 days. Seek emergency care if you develop difficulty swallowing or difficulty breathing. .     Patient verbalized understanding and is agreeable to plan. The patient was discharged ambulatory and in stable condition.    Alaina Vines PA-C on 1/25/2022 at 3:39 PM

## 2022-02-15 ENCOUNTER — ANCILLARY PROCEDURE (OUTPATIENT)
Dept: GENERAL RADIOLOGY | Facility: CLINIC | Age: 57
End: 2022-02-15
Attending: PHYSICIAN ASSISTANT
Payer: COMMERCIAL

## 2022-02-15 ENCOUNTER — OFFICE VISIT (OUTPATIENT)
Dept: URGENT CARE | Facility: URGENT CARE | Age: 57
End: 2022-02-15
Payer: COMMERCIAL

## 2022-02-15 VITALS
SYSTOLIC BLOOD PRESSURE: 162 MMHG | BODY MASS INDEX: 32.44 KG/M2 | OXYGEN SATURATION: 97 % | RESPIRATION RATE: 20 BRPM | TEMPERATURE: 100.5 F | HEART RATE: 98 BPM | WEIGHT: 189 LBS | DIASTOLIC BLOOD PRESSURE: 88 MMHG

## 2022-02-15 DIAGNOSIS — S99.912A INJURY OF LEFT ANKLE, INITIAL ENCOUNTER: ICD-10-CM

## 2022-02-15 DIAGNOSIS — Z20.822 SUSPECTED COVID-19 VIRUS INFECTION: Primary | ICD-10-CM

## 2022-02-15 DIAGNOSIS — M25.472 LEFT ANKLE SWELLING: ICD-10-CM

## 2022-02-15 PROCEDURE — U0003 INFECTIOUS AGENT DETECTION BY NUCLEIC ACID (DNA OR RNA); SEVERE ACUTE RESPIRATORY SYNDROME CORONAVIRUS 2 (SARS-COV-2) (CORONAVIRUS DISEASE [COVID-19]), AMPLIFIED PROBE TECHNIQUE, MAKING USE OF HIGH THROUGHPUT TECHNOLOGIES AS DESCRIBED BY CMS-2020-01-R: HCPCS | Performed by: PHYSICIAN ASSISTANT

## 2022-02-15 PROCEDURE — 73610 X-RAY EXAM OF ANKLE: CPT | Mod: LT | Performed by: RADIOLOGY

## 2022-02-15 PROCEDURE — 99214 OFFICE O/P EST MOD 30 MIN: CPT | Performed by: PHYSICIAN ASSISTANT

## 2022-02-15 PROCEDURE — U0005 INFEC AGEN DETEC AMPLI PROBE: HCPCS | Performed by: PHYSICIAN ASSISTANT

## 2022-02-15 RX ORDER — HYDROCODONE BITARTRATE AND ACETAMINOPHEN 5; 325 MG/1; MG/1
1 TABLET ORAL EVERY 6 HOURS PRN
Qty: 10 TABLET | Refills: 0 | Status: SHIPPED | OUTPATIENT
Start: 2022-02-15 | End: 2022-02-18

## 2022-02-15 NOTE — PROGRESS NOTES
Assessment & Plan     Suspected COVID-19 virus infection  covid pending  Check my chart for results  - Symptomatic; Unknown COVID-19 Virus (Coronavirus) by PCR Nose    Injury of left ankle, initial encounter  Ankle xray Negative for acute findings, read by Rajan Aguillon PA-C [facility] at time of visit.  RICE treatment: Rest, Ice, compression, elevation   OTC motrin  vicodin for breakthrough pain  DME ankle brace, crutches  - XR Ankle Left G/E 3 Views; Future  - Ankle/Foot Bracing Supplies Order for DME - ONLY FOR DME  - HYDROcodone-acetaminophen (NORCO) 5-325 MG tablet; Take 1 tablet by mouth every 6 hours as needed for severe pain  - Crutches Order for DME - ONLY FOR DME    Left ankle swelling  RICE treatment: Rest, Ice, compression, elevation   Motrin  Return to activity as tolerated  - XR Ankle Left G/E 3 Views; Future  - Ankle/Foot Bracing Supplies Order for DME - ONLY FOR DME  - Crutches Order for DME - ONLY FOR DME      30 minutes spent on the date of the encounter doing chart review, history and exam, documentation and further activities per the note       Tobacco Cessation:   reports that she has been smoking. She has never used smokeless tobacco.    Follow up with PCP as needed    No follow-ups on file.    Rajan Aguillon PA-C  [facility] BRANDEN Valle is a 56 year old who presents for the following health issues     HPI     Left ankle injury  Mild ankle swelling    Review of Systems   Constitutional, HEENT, cardiovascular, pulmonary, gi and gu systems are negative, except as otherwise noted.      Objective    BP (!) 162/88   Pulse 98   Temp (!) 100.5  F (38.1  C)   Resp 20   Wt 85.7 kg (189 lb)   SpO2 97%   BMI 32.44 kg/m    Body mass index is 32.44 kg/m .  Physical Exam   GENERAL: healthy, alert and no distress  MS: Positive for left lateral ankle tenderness, localized swelling  SKIN: no suspicious lesions or rashes  NEURO: Normal strength and tone, mentation intact and  speech normal    Results for orders placed or performed in visit on 02/15/22   XR Ankle Left G/E 3 Views     Status: None    Narrative    EXAM: XR ANKLE LEFT G/E 3 VIEWS  LOCATION: [facility]  DATE/TIME: 2/15/2022 5:56 PM    INDICATION:  Injury of left ankle, initial encounter, Left ankle swelling  COMPARISON: None.      Impression    IMPRESSION: No acute fracture or dislocation. Small chronic appearing bone fragment adjacent to the distal tip of the lateral malleolus. Mild lateral ankle soft tissue swelling. Plantar calcaneal spur.

## 2022-02-16 LAB — SARS-COV-2 RNA RESP QL NAA+PROBE: NEGATIVE

## 2022-02-16 NOTE — PATIENT INSTRUCTIONS
Patient Education     Ankle Sprain (Adult)    An ankle sprain is a stretching or tearing of the ligaments that hold the ankle joint together. There are no broken bones.  An ankle sprain is a common injury for both children and adults. It happens when the ankle turns, twists, or rolls in an awkward way. This can be caused by a sports injury. Or it can happen from doing something as simple as stepping on an uneven surface.  Ligaments are made of tough connective tissue. Normally, ligaments stretch a certain amount and then go back to their normal place. A sprain happens when a ligament is forced to stretch more than the normal amount. A severe sprain can actually tear the ligaments. If you have a severe sprain, you may have felt or heard something like a pop when you were injured.  Ankle sprains are given a grade depending on whether they are mild, moderate, or severe:    Grade 1 sprain. A mild sprain with minor stretching and damage to the ligament.    Grade 2 sprain. A moderate sprain where the ligament is partly torn.    Grade 3 sprain. The most severe kind of sprain. The ligament is completely torn.  Most sprains take about 4 to 6 weeks to heal. A severe sprain can take several months to recover.  Your healthcare provider may order X-rays to be sure you don t have a fracture, or broken bone.  The injured area will feel sore. Swelling and pain may make it hard to walk. You may need crutches if walking is painful. Or your provider may have you use a cast boot or air splint. This will depend on the grade of ankle sprain that you have.  Home care    For a Grade 1 sprain, use RICE (rest, ice, compression, and elevation):    Rest your ankle. Don t walk on it.    Ice should be used right away to help control swelling. Place an ice pack over the injured area for 20 minutes. Do this every 3 to 6 hours for the first 24 to 48 hours. Keep using ice packs to ease pain and swelling as needed. To make an ice pack, put ice  cubes in a plastic bag that seals at the top. Wrap the bag in a clean, thin towel or cloth. Never put ice or an ice pack directly on the skin. The ice pack can be put right on the cast, bandage, or splint. As the ice melts, be careful that the cast, bandage, or splint doesn t get wet. If you have a boot, open it to apply an ice pack, unless told otherwise by your provider.    Compression devices help to control swelling. They also keep the ankle from moving and support your injured ankle. These devices include dressings, bandages, and wraps.    Elevate or raise your ankle above the level of your heart when sitting or lying down. This is very important for the first 48 hours.    Follow the RICE guidelines for a Grade 2 sprain. This type of sprain will take longer to heal. Your provider may have you wear a splint, cast, or brace to keep your ankle from moving.     If you have a Grade 3 sprain, you are at risk for long-term ankle instability. In rare cases, surgery may be needed. Your provider may have you wear a short leg cast or a walking boot for 2 to 3 weeks.    After 48 hours, it may be helpful to apply heat for 20 minutes several times a day. You can do this with a heating pad or warm compress. Or you may want to go back and forth between using ice and heat. Never apply heat directly to the skin. Always wrap the heating pad or warm compress in a clean, thin towel or cloth.    You may use over-the-counter pain medicine (NSAIDS or nonsteroidal anti-inflammatory drugs) to control pain, unless another pain medicine was prescribed. Talk with your provider before using these medicines if you have chronic liver or kidney disease, or have ever had a stomach ulcer or gastrointestinal bleeding.    Follow any rehabilitation exercises your provider gives you. These can help you be more flexible and improve your balance and coordination. This is helpful in preventing long-term ankle problems.  Prevention  To help prevent  ankle sprains, it s important to have good strength, balance, and flexibility. Be sure to:    Always warm up before you exercise or do something very active    Be careful when walking or running on uneven or cracked surfaces    Wear shoes that are in good condition and fit well    Listen to your body s signals to slow down when you are in pain or tired  Follow-up care  Any X-rays you had today don t show any broken bones, breaks, or fractures. Sometimes fractures don t show up on the first X-ray. Bruises and sprains can sometimes hurt as much as a fracture. These injuries can take time to heal completely. If your symptoms don t get better or they get worse, talk with your healthcare provider. You may need a repeat X-ray.  Follow up with your healthcare provider, or as advised. Check for any warning signs listed below.  When to seek medical advice  Call your healthcare provider right away if any of these occur:    Fever of 100.4 F (38 C) or higher, or as directed by your healthcare provider    Chills    The injury doesn t seem to be healing    The swelling comes back    The cast or splint has a bad smell    The plaster cast or splint gets wet or soft    The fiberglass cast or splint gets wet and does not dry for 24 hours    The pain or swelling increases, or redness appears    Your toes become cold, blue, numb, or tingly    The skin is discolored (looks blue, purple, or gray), has blisters, or is irritated    You re-injure your ankle  Jarrod last reviewed this educational content on 5/1/2018 2000-2021 [employer]. All rights reserved. This information is not intended as a substitute for professional medical care. Always follow your healthcare professional's instructions.

## 2022-04-04 ENCOUNTER — OFFICE VISIT (OUTPATIENT)
Dept: URGENT CARE | Facility: URGENT CARE | Age: 57
End: 2022-04-04
Payer: COMMERCIAL

## 2022-04-04 VITALS
SYSTOLIC BLOOD PRESSURE: 118 MMHG | WEIGHT: 190 LBS | TEMPERATURE: 100.3 F | BODY MASS INDEX: 32.61 KG/M2 | HEART RATE: 91 BPM | DIASTOLIC BLOOD PRESSURE: 84 MMHG | OXYGEN SATURATION: 97 %

## 2022-04-04 DIAGNOSIS — J06.9 UPPER RESPIRATORY TRACT INFECTION, UNSPECIFIED TYPE: Primary | ICD-10-CM

## 2022-04-04 DIAGNOSIS — Z87.09 HISTORY OF ASTHMA: ICD-10-CM

## 2022-04-04 DIAGNOSIS — R05.9 COUGH: ICD-10-CM

## 2022-04-04 LAB — SARS-COV-2 RNA RESP QL NAA+PROBE: NEGATIVE

## 2022-04-04 PROCEDURE — U0003 INFECTIOUS AGENT DETECTION BY NUCLEIC ACID (DNA OR RNA); SEVERE ACUTE RESPIRATORY SYNDROME CORONAVIRUS 2 (SARS-COV-2) (CORONAVIRUS DISEASE [COVID-19]), AMPLIFIED PROBE TECHNIQUE, MAKING USE OF HIGH THROUGHPUT TECHNOLOGIES AS DESCRIBED BY CMS-2020-01-R: HCPCS | Performed by: FAMILY MEDICINE

## 2022-04-04 PROCEDURE — U0005 INFEC AGEN DETEC AMPLI PROBE: HCPCS | Performed by: FAMILY MEDICINE

## 2022-04-04 PROCEDURE — 99214 OFFICE O/P EST MOD 30 MIN: CPT | Performed by: FAMILY MEDICINE

## 2022-04-04 RX ORDER — BENZONATATE 100 MG/1
100 CAPSULE ORAL 3 TIMES DAILY PRN
Qty: 21 CAPSULE | Refills: 0 | Status: SHIPPED | OUTPATIENT
Start: 2022-04-04 | End: 2022-04-11

## 2022-04-04 RX ORDER — ALBUTEROL SULFATE 90 UG/1
2 AEROSOL, METERED RESPIRATORY (INHALATION) EVERY 6 HOURS
Qty: 18 G | Refills: 0 | Status: SHIPPED | OUTPATIENT
Start: 2022-04-04 | End: 2022-05-04

## 2022-04-04 RX ORDER — FLUTICASONE PROPIONATE 50 MCG
2 SPRAY, SUSPENSION (ML) NASAL DAILY
Qty: 15.8 ML | Refills: 0 | Status: SHIPPED | OUTPATIENT
Start: 2022-04-04 | End: 2022-05-04

## 2022-04-04 NOTE — PROGRESS NOTES
SUBJECTIVE: Tori Humphreys is a 56 year old female presenting with a chief complaint of fever, nasal congestion, cough  and ha.  Onset of symptoms was 2 day(s) ago.  Current and Associated symptoms: none  Treatment measures tried include inhaler.  Predisposing factors include HX of asthma.    No past medical history on file.  Allergies   Allergen Reactions     Aspirin [Dihydroxyaluminum Aminoacetate]      Augmentin      Latex      Unknown [No Clinical Screening - See Comments]      cantaloupe     Wasps [Hornets]      And bee     Social History     Tobacco Use     Smoking status: Current Every Day Smoker     Smokeless tobacco: Never Used   Substance Use Topics     Alcohol use: No       ROS:  SKIN: no rash  GI: no vomiting    OBJECTIVE:  /84 (BP Location: Right arm, Patient Position: Sitting, Cuff Size: Adult Regular)   Pulse 91   Temp 100.3  F (37.9  C) (Oral)   Wt 86.2 kg (190 lb)   SpO2 97%   BMI 32.61 kg/m  GENERAL APPEARANCE: healthy, alert and no distress  EYES: EOMI,  PERRL, conjunctiva clear  HENT: ear canals and TM's normal.  Nose and mouth without ulcers, erythema or lesions  RESP: lungs clear to auscultation - no rales, rhonchi or wheezes  SKIN: no suspicious lesions or rashes      ICD-10-CM    1. Upper respiratory tract infection, unspecified type  J06.9 fluticasone (FLONASE) 50 MCG/ACT nasal spray   2. Cough  R05.9 Symptomatic; Unknown COVID-19 Virus (Coronavirus) by PCR Nose     benzonatate (TESSALON) 100 MG capsule     albuterol (PROAIR HFA) 108 (90 Base) MCG/ACT inhaler   3. History of asthma  Z87.09 albuterol (PROAIR HFA) 108 (90 Base) MCG/ACT inhaler     Declines Prednisone  Fluids/Rest, f/u if worse/not any better

## 2022-04-05 ENCOUNTER — TELEPHONE (OUTPATIENT)
Dept: NURSING | Facility: CLINIC | Age: 57
End: 2022-04-05
Payer: COMMERCIAL

## 2022-04-05 NOTE — TELEPHONE ENCOUNTER
Patient calls for covid-19 results. She is given negative result. Patient is requesting a message be sent to urgent care provider. She is still continuing to cough frequently with the inhaler and Benzonatate. She is wondering if an antibiotic can be prescribed. Patient reports that provider mentioned it to her yesterday but said would have to wait for covid-19 test result. Patient says that she is allergic to prednisone.    Please advise and contact patient.    Medina Mccrary RN  [facility]

## 2022-04-05 NOTE — TELEPHONE ENCOUNTER
Coronavirus (COVID-19) Notification    Lab Result   Lab test 2019-nCoV rRt-PCR OR SARS-COV-2 PCR    Nasopharyngeal AND/OR Oropharyngeal swab is NEGATIVE for 2019-nCoV RNA [OR] SARS-COV-2 RNA (COVID-19) RNA    Your result was negative. This means that we didn't find the virus that causes COVID-19 in your sample. A test may show negative when you do actually have the virus. This can happen when the virus is in the early stages of infection, before you feel illness symptoms.    If you have symptoms     Stay home and away from others  o For at least 5 days after your symptoms started, AND   o You are fever free for 24 hours (with no medicine that reduces fever), AND  o Your other symptoms are better.    Wear a mask for 10 full days any time you are around others.    If you've been told by a doctor that you were severely ill with COVID-19 or are immunocompromised, you should isolate for at least 10 days.    During this time:    Stay home. Don't go to work, school or anywhere else.     Stay in your own room, including for meals. Use your own bathroom if you can.    Stay away from others in your home. No hugging, kissing or shaking hands. No visitors.    Clean  high touch  surfaces often (doorknobs, counters, handles, etc.). Use a household cleaning spray or wipes. You can find a full list on the EPA website at www.epa.gov/pesticide-registration/list-n-disinfectants-use-against-sars-cov-2.    Cover your mouth and nose with a mask or other face covering to avoid spreading germs.    Wash your hands and face often with soap and water.    Going back to work  Check with your employer for any guidelines to follow for going back to work.    Employers, schools, and daycares: This document serves as formal notice that your employee tested negative for COVID-19, as of the testing date shown above.    If your symptoms worsen or other concerning symptoms, contact PCP, oncare or consider returning to Emergency Dept.    Where can I get  more information?     [facility] [place]: [email or web address]    Coronavirus Basics: [email or web address]/coronavirus/basics.html    [facility] Hotline (137-806-1544)    Medina Mccrary RN

## 2022-04-10 ENCOUNTER — HEALTH MAINTENANCE LETTER (OUTPATIENT)
Age: 57
End: 2022-04-10

## 2022-06-05 ENCOUNTER — HEALTH MAINTENANCE LETTER (OUTPATIENT)
Age: 57
End: 2022-06-05

## 2022-09-16 ENCOUNTER — ANCILLARY PROCEDURE (OUTPATIENT)
Dept: GENERAL RADIOLOGY | Facility: CLINIC | Age: 57
End: 2022-09-16
Attending: PHYSICIAN ASSISTANT
Payer: OTHER MISCELLANEOUS

## 2022-09-16 ENCOUNTER — OFFICE VISIT (OUTPATIENT)
Dept: URGENT CARE | Facility: URGENT CARE | Age: 57
End: 2022-09-16
Payer: OTHER MISCELLANEOUS

## 2022-09-16 VITALS
OXYGEN SATURATION: 95 % | HEART RATE: 86 BPM | BODY MASS INDEX: 32.68 KG/M2 | WEIGHT: 190.4 LBS | TEMPERATURE: 98.4 F | RESPIRATION RATE: 16 BRPM | DIASTOLIC BLOOD PRESSURE: 72 MMHG | SYSTOLIC BLOOD PRESSURE: 121 MMHG

## 2022-09-16 DIAGNOSIS — S69.92XA THUMB INJURY, LEFT, INITIAL ENCOUNTER: Primary | ICD-10-CM

## 2022-09-16 DIAGNOSIS — S69.92XA THUMB INJURY, LEFT, INITIAL ENCOUNTER: ICD-10-CM

## 2022-09-16 PROCEDURE — 99214 OFFICE O/P EST MOD 30 MIN: CPT | Performed by: PHYSICIAN ASSISTANT

## 2022-09-16 PROCEDURE — 73140 X-RAY EXAM OF FINGER(S): CPT | Mod: TC | Performed by: RADIOLOGY

## 2022-09-16 RX ORDER — FERROUS SULFATE 325(65) MG
TABLET ORAL
COMMUNITY
Start: 2021-03-12

## 2022-09-16 RX ORDER — MULTIPLE VITAMINS W/ MINERALS TAB 9MG-400MCG
1 TAB ORAL DAILY
COMMUNITY

## 2022-09-16 RX ORDER — PLECANATIDE 3 MG/1
1 TABLET ORAL
COMMUNITY
Start: 2022-01-31

## 2022-09-16 RX ORDER — VITAMIN B COMPLEX
TABLET ORAL DAILY
COMMUNITY

## 2022-09-16 NOTE — PROGRESS NOTES
"EMP: Mayra  RYAN: 10am  DOI: 9/16/22  ARIANNA: crush and pulled in handicap ramp    SUBJECTIVE:  Chief Complaint   Patient presents with     Thumb Discomfort     \"Smash thumb\". Rated pain 10/10. There is some swelling and brownish color.      Tori Humphreys is a 56 year old female presents with a chief complaint of left finger  first pain, swelling, tenderness and decreased range of motion.  The injury occurred 3 hour(s) ago.   The injury happened while at work. How: crush injury immediate pain.  The patient complained of moderate and severe pain  and has had decreased ROM.  Pain exacerbated by weight-bearing and movement.  Relieved by elevation.  She treated it initially with ice. This is the first time this type of injury has occurred to this patient.     History reviewed. No pertinent past medical history.  Current Outpatient Medications   Medication Sig Dispense Refill     citalopram (CELEXA) 40 MG tablet Take 40 mg by mouth daily.       ferrous sulfate (FEROSUL) 325 (65 Fe) MG tablet        levothyroxine (SYNTHROID, LEVOTHROID) 50 MCG tablet Take 50 mcg by mouth daily.       multivitamin w/minerals (MULTI-VITAMIN) tablet Take 1 tablet by mouth daily       plecanatide (TRULANCE) 3 MG tablet Take 1 tablet by mouth       Vitamin D3 (CHOLECALCIFEROL) 25 mcg (1000 units) tablet Take by mouth daily       albuterol (PROAIR HFA) 108 (90 Base) MCG/ACT inhaler Inhale 2 puffs into the lungs every 6 hours 18 g 0     ketotifen (ZADITOR) 0.025 % ophthalmic solution Place 1 drop into both eyes 2 times daily 5 mL 0     Social History     Tobacco Use     Smoking status: Current Every Day Smoker     Smokeless tobacco: Never Used   Substance Use Topics     Alcohol use: No       ROS:  Review of systems negative except as stated above.    EXAM:   /72 (BP Location: Right arm, Patient Position: Sitting, Cuff Size: Adult Large)   Pulse 86   Temp 98.4  F (36.9  C) (Oral)   Resp 16   Wt 86.4 kg (190 lb 6.4 oz)   SpO2 95%   BMI " 32.68 kg/m    Gen: healthy,alert,no distress  Extremity: Circumduction intact, pain with flexion, swollen, brusing at distal phalanx, no subungual hematoma  EXTREMITIES: peripheral pulses normal  NEURO: Normal strength and tone, sensory exam grossly normal, mentation intact and speech normal    X-ray image initially interpreted in clinic by me in order to rule out fracture/dislocation.  No evidence appreciated.      ASSESSMENT:   (S69.92XA) Thumb injury, left, initial encounter  (primary encounter diagnosis)  Plan: XR Finger Left G/E 2 Views, Orthopedic          Referral, CANCELED: XR Hand Right G/E        3 Views      There are no Patient Instructions on file for this visit.      Follow up early next week  Red flags and emergent follow up discussed, and understood by patient

## 2023-08-26 ENCOUNTER — HEALTH MAINTENANCE LETTER (OUTPATIENT)
Age: 58
End: 2023-08-26

## 2024-01-14 ENCOUNTER — OFFICE VISIT (OUTPATIENT)
Dept: URGENT CARE | Facility: URGENT CARE | Age: 59
End: 2024-01-14
Payer: COMMERCIAL

## 2024-01-14 VITALS
SYSTOLIC BLOOD PRESSURE: 128 MMHG | RESPIRATION RATE: 18 BRPM | WEIGHT: 170 LBS | TEMPERATURE: 99.1 F | OXYGEN SATURATION: 97 % | HEART RATE: 79 BPM | BODY MASS INDEX: 29.18 KG/M2 | DIASTOLIC BLOOD PRESSURE: 67 MMHG

## 2024-01-14 DIAGNOSIS — J06.9 VIRAL URI WITH COUGH: Primary | ICD-10-CM

## 2024-01-14 LAB
DEPRECATED S PYO AG THROAT QL EIA: NEGATIVE
GROUP A STREP BY PCR: NOT DETECTED

## 2024-01-14 PROCEDURE — 87651 STREP A DNA AMP PROBE: CPT

## 2024-01-14 PROCEDURE — 87635 SARS-COV-2 COVID-19 AMP PRB: CPT

## 2024-01-14 PROCEDURE — 99213 OFFICE O/P EST LOW 20 MIN: CPT

## 2024-01-14 NOTE — PROGRESS NOTES
Assessment & Plan     Viral URI with cough  Patient presenting with cough, rhinorrhea, fatigue, and without fever for 4 days duration.  Recently improved ship.  Vitals stable. Exam unremarkable including clear lungs.  COVID pending and Rapid strep negative Will proceed with conservative management. Discussed Tylenol and ibuprofen for discomfort and fever, nasal saline for congestion, and oral decongestants or mucolytics.  Also discussed patient to return if worsening or new concerning symptoms.   - Streptococcus A Rapid Screen w/Reflex to PCR - Clinic Collect  - Group A Streptococcus PCR Throat Swab  - Symptomatic COVID-19 Virus (Coronavirus) by PCR Nose    Return if symptoms worsen or fail to improve.    [facility] BRANDEN Valle is a 58 year old, presenting for the following health issues:  Urgent Care (Sore throat, coughing for a bout 4 days now, tested covid twice at home and its negative )    HPI     Acute Illness  Acute illness concerns: Cough and congestion  Onset/Duration: 4 days   Symptoms:  Fever: No  Chills/Sweats: YES  Headache (location?): YES  Sinus Pressure: YES  Conjunctivitis:  No  Ear Pain: no  Rhinorrhea: YES  Congestion: YES  Sore Throat: YES  Cough: YES  Wheeze: No  Decreased Appetite: No  Nausea: No  Vomiting: No  Diarrhea: No  Dysuria/Freq.: No  Dysuria or Hematuria: No  Fatigue/Achiness: No  Sick/Strep Exposure: No  Therapies tried and outcome: Nyquil and dayquil  Recent cruise.   Hx of asthma.       Review of Systems   Constitutional, HEENT, cardiovascular, pulmonary, gi and gu systems are negative, except as otherwise noted.      Objective    /67   Pulse 79   Temp 99.1  F (37.3  C)   Resp 18   Wt 77.1 kg (170 lb)   SpO2 97%   BMI 29.18 kg/m    Body mass index is 29.18 kg/m .  Physical Exam   GENERAL: healthy, alert and no distress  NECK: no adenopathy, no asymmetry, masses, or scars and thyroid normal to palpation  RESP: lungs  clear to auscultation - no rales, rhonchi or wheezes  CV: regular rate and rhythm, normal S1 S2, no S3 or S4, no murmur, click or rub, no peripheral edema and peripheral pulses strong  ABDOMEN: soft, nontender, no hepatosplenomegaly, no masses and bowel sounds normal  MS: no gross musculoskeletal defects noted, no edema    Results for orders placed or performed in visit on 01/14/24 (from the past 24 hour(s))   Streptococcus A Rapid Screen w/Reflex to PCR - Clinic Collect    Specimen: Throat; Swab   Result Value Ref Range    Group A Strep antigen Negative Negative

## 2024-01-15 LAB — SARS-COV-2 RNA RESP QL NAA+PROBE: NEGATIVE

## 2024-01-16 ENCOUNTER — OFFICE VISIT (OUTPATIENT)
Dept: URGENT CARE | Facility: URGENT CARE | Age: 59
End: 2024-01-16
Payer: MEDICARE

## 2024-01-16 ENCOUNTER — TELEPHONE (OUTPATIENT)
Dept: URGENT CARE | Facility: URGENT CARE | Age: 59
End: 2024-01-16

## 2024-01-16 VITALS
SYSTOLIC BLOOD PRESSURE: 91 MMHG | TEMPERATURE: 98.4 F | WEIGHT: 170 LBS | HEART RATE: 96 BPM | OXYGEN SATURATION: 99 % | BODY MASS INDEX: 29.18 KG/M2 | DIASTOLIC BLOOD PRESSURE: 63 MMHG

## 2024-01-16 DIAGNOSIS — J06.9 VIRAL URI WITH COUGH: Primary | ICD-10-CM

## 2024-01-16 PROBLEM — R25.1 TREMOR: Status: ACTIVE | Noted: 2021-11-30

## 2024-01-16 PROBLEM — K21.9 GERD (GASTROESOPHAGEAL REFLUX DISEASE): Status: ACTIVE | Noted: 2019-10-23

## 2024-01-16 PROBLEM — E78.00 HIGH CHOLESTEROL: Status: ACTIVE | Noted: 2019-04-01

## 2024-01-16 PROBLEM — F10.10 CHRONIC ALCOHOL ABUSE: Status: ACTIVE | Noted: 2017-11-03

## 2024-01-16 PROBLEM — K59.09 CONSTIPATION, CHRONIC: Status: ACTIVE | Noted: 2020-01-02

## 2024-01-16 PROBLEM — R73.03 PREDIABETES: Status: ACTIVE | Noted: 2023-01-09

## 2024-01-16 PROBLEM — R23.2 HOT FLASHES: Status: ACTIVE | Noted: 2020-10-19

## 2024-01-16 PROBLEM — E55.9 VITAMIN D DEFICIENCY: Status: ACTIVE | Noted: 2018-02-09

## 2024-01-16 PROBLEM — E66.9 OBESITY (BMI 30-39.9): Status: ACTIVE | Noted: 2023-01-09

## 2024-01-16 PROBLEM — Z90.3 INTESTINAL MALABSORPTION FOLLOWING GASTRECTOMY: Status: ACTIVE | Noted: 2019-10-23

## 2024-01-16 PROBLEM — K91.2 INTESTINAL MALABSORPTION FOLLOWING GASTRECTOMY: Status: ACTIVE | Noted: 2019-10-23

## 2024-01-16 PROBLEM — Z91.030 BEE STING ALLERGY: Status: ACTIVE | Noted: 2023-12-19

## 2024-01-16 PROCEDURE — 99213 OFFICE O/P EST LOW 20 MIN: CPT | Performed by: NURSE PRACTITIONER

## 2024-01-16 RX ORDER — BENZONATATE 100 MG/1
100 CAPSULE ORAL 3 TIMES DAILY PRN
Qty: 30 CAPSULE | Refills: 0 | Status: SHIPPED | OUTPATIENT
Start: 2024-01-16 | End: 2024-01-26

## 2024-01-16 NOTE — PROGRESS NOTES
ICD-10-CM    1. Viral URI with cough  J06.9 benzonatate (TESSALON) 100 MG capsule      Will add benzonatate.Rest.  Fluids.  Delsym for cough suppression at night.  Mucinex as desired during the day.  Tylenol or ibuprofen as needed for fever or pain.  Recheck in 1 week if symptoms have not improved, sooner if they worsen.  Decongestant as needed.    Red flag warning signs and when to go to the emergency room discussed.  Reviewed potential adverse reactions to medications.      SUBJECTIVE:   Tori Humphreys is a 58 year old female presenting with a chief complaint of cough, nasal congestion, fever, sore throat, hoarseness for 6 days.  She was seen 2 days ago and diagnosed with a viral upper respiratory infection.  She returns now because she is not feeling better and she needs a note to stay home from work.      Review of systems is negative except for as noted in the HPI.    OBJECTIVE  BP 91/63   Pulse 96   Temp 98.4  F (36.9  C) (Tympanic)   Wt 77.1 kg (170 lb)   SpO2 99%   BMI 29.18 kg/m        GENERAL: Alert, mild distress  SKIN: skin is clear, no rash or abnormal pigmentation  HEAD: The head is normocephalic.   EYES: The eyes are normal. The conjunctivae and cornea normal.   NECK: The neck is supple and thyroid is normal, no masses; LYMPH NODES: No adenopathy  HENT: Bilateral tympanic membranes and canals appear normal, nasal passages are swollen with clear rhinorrhea, pharynx appears normal  LUNGS: The lung fields are clear to auscultation, no rales, rhonchi, wheezing or retractions  CV: Rhythm is regular. S1 and S2 are normal. No murmurs.  EXTREMITIES: Symmetric extremities no deformities    Silvia Jordan, APRN, CNP  [facility] Provider    The use of Dragon/RaySat dictation services may have been used to construct the content in this note; any grammatical or spelling errors are non-intentional. Please contact the author of this note directly if you are in need of any clarification.

## 2024-01-16 NOTE — LETTER
January 16, 2024      Tori Humphreys  [address] [address]        To Whom It May Concern:    Tori Humphreys  was seen on 01/16/2024.  Please excuse her  until 01/22/2024 due to illness.        Sincerely,        SOFÍA HOUSE, CNP

## 2024-01-16 NOTE — TELEPHONE ENCOUNTER
Please inform patient an antibiotic is still not indicated.  If she have symptoms longer than 10 days she may return for recheck if still having problems.

## 2024-01-16 NOTE — TELEPHONE ENCOUNTER
Pt. Is still not feeling well and would like to get a letter for work. Pt. Would to get off this whole week and back to work next week.     Jayla Leon, CHRISTY

## 2024-01-16 NOTE — TELEPHONE ENCOUNTER
Received a call from the patient stating she was seen in Urgent Care on 1/14/24. Patient is wondering about her COVID and strep results? Triage RN advised patient both tests came back negative.     Patient is requesting for the  provider to send a script to the pharmacy for an antibiotic? Patient states she continues to have a bad cough along with nasal drainage. Patient feels like the drainage is moving into her chest. Triage RN advised patient she may need to schedule a follow-up appointment with her primary care provider. Patient refused and requested a message be sent to the  provider.     Ph[facility]: [facility] [address]    Will route to provider for review. Please route to the  team to follow-up with the patient.     Thank you,    Can we leave a detailed message on this number? YES  Phone number patient can be reached at: Cell number on file:    Telephone Information:   Mobile [phone]       Felicia Zamudio RN  [facility] Triage

## 2024-01-16 NOTE — PATIENT INSTRUCTIONS
Viral Respiratory Infection: Care Instructions  Overview     A viral respiratory infection is an infection of the nose, sinuses, or throat caused by a virus. Colds and the flu are common types of viral respiratory infections.  The symptoms of a viral respiratory infection often start quickly. They include a fever, sore throat, and runny nose. You may also just not feel well. Or you may not want to eat much.  Most viral infections can be treated with home care. This may include drinking lots of fluids and taking over-the-counter pain medicine. You will probably feel better in 4 to 10 days.  Antibiotics are not used to treat a viral infection. Antibiotics don't kill viruses, so they won't help cure a viral illness.  In some cases, a doctor may prescribe antiviral medicine to help your body fight a serious viral infection.  Follow-up care is a key part of your treatment and safety. Be sure to make and go to all appointments, and call your doctor if you are having problems. It's also a good idea to know your test results and keep a list of the medicines you take.  How can you care for yourself at home?  To prevent dehydration, drink plenty of fluids. Choose water and other clear liquids until you feel better. If you have kidney, heart, or liver disease and have to limit fluids, talk with your doctor before you increase the amount of fluids you drink.  Ask your doctor if you can take an over-the-counter pain medicine, such as acetaminophen (Tylenol), ibuprofen (Advil, Motrin), or naproxen (Aleve). Be safe with medicines. Read and follow all instructions on the label. No one younger than 20 should take aspirin. It has been linked to Reye syndrome, a serious illness.  Be careful when taking over-the-counter cold or flu medicines and Tylenol at the same time. Many of these medicines have acetaminophen, which is Tylenol. Read the labels to make sure that you are not taking more than the recommended dose. Too much  acetaminophen (Tylenol) can be harmful.  Get plenty of rest.  Use saline (saltwater) nasal washes to help keep your nasal passages open and wash out mucus and allergens. You can buy saline nose sprays at a grocery store or drugstore. Follow the instructions on the package. Or you can make your own at home. Add 1 teaspoon of non-iodized salt and 1 teaspoon of baking soda to 2 cups of distilled or boiled and cooled water. Fill a squeeze bottle or neti pot with the nasal wash. Then put the tip into your nostril, and lean over the sink. With your mouth open, gently squirt the liquid. Repeat on the other side.  Use a vaporizer or humidifier to add moisture to your bedroom. Follow the instructions for cleaning the machine.  Do not smoke or allow others to smoke around you. If you need help quitting, talk to your doctor about stop-smoking programs and medicines. These can increase your chances of quitting for good.  When should you call for help?   Call 911 anytime you think you may need emergency care. For example, call if:    You have severe trouble breathing.   Call your doctor now or seek immediate medical care if:    You have a new or higher fever.     Your fever lasts more than 48 hours.     You have trouble breathing.     You have a fever with a stiff neck or a severe headache.     You are sensitive to light.     You feel very sleepy or confused.   Watch closely for changes in your health, and be sure to contact your doctor if:    You do not get better as expected.

## 2024-03-23 ENCOUNTER — HEALTH MAINTENANCE LETTER (OUTPATIENT)
Age: 59
End: 2024-03-23

## 2024-04-29 ENCOUNTER — HOSPITAL ENCOUNTER (EMERGENCY)
Facility: CLINIC | Age: 59
Discharge: HOME | End: 2024-04-30
Attending: STUDENT IN AN ORGANIZED HEALTH CARE EDUCATION/TRAINING PROGRAM | Admitting: STUDENT IN AN ORGANIZED HEALTH CARE EDUCATION/TRAINING PROGRAM
Payer: COMMERCIAL

## 2024-04-29 ENCOUNTER — APPOINTMENT (OUTPATIENT)
Dept: CT IMAGING | Facility: CLINIC | Age: 59
End: 2024-04-29
Attending: STUDENT IN AN ORGANIZED HEALTH CARE EDUCATION/TRAINING PROGRAM
Payer: COMMERCIAL

## 2024-04-29 DIAGNOSIS — R07.9 CHEST PAIN, UNSPECIFIED TYPE: ICD-10-CM

## 2024-04-29 LAB
ANION GAP SERPL CALCULATED.3IONS-SCNC: 14 MMOL/L (ref 7–15)
BASOPHILS # BLD AUTO: 0.1 10E3/UL (ref 0–0.2)
BASOPHILS NFR BLD AUTO: 1 %
BUN SERPL-MCNC: 14 MG/DL (ref 8–23)
CALCIUM SERPL-MCNC: 9.4 MG/DL (ref 8.6–10)
CHLORIDE SERPL-SCNC: 106 MMOL/L (ref 98–107)
CREAT SERPL-MCNC: 0.53 MG/DL (ref 0.51–0.95)
D DIMER PPP FEU-MCNC: 0.6 UG/ML FEU (ref 0–0.5)
DEPRECATED HCO3 PLAS-SCNC: 20 MMOL/L (ref 22–29)
EGFRCR SERPLBLD CKD-EPI 2021: >90 ML/MIN/1.73M2
EOSINOPHIL # BLD AUTO: 0.2 10E3/UL (ref 0–0.7)
EOSINOPHIL NFR BLD AUTO: 3 %
ERYTHROCYTE [DISTWIDTH] IN BLOOD BY AUTOMATED COUNT: 12.3 % (ref 10–15)
GLUCOSE SERPL-MCNC: 74 MG/DL (ref 70–99)
HCT VFR BLD AUTO: 42.2 % (ref 35–47)
HGB BLD-MCNC: 14.7 G/DL (ref 11.7–15.7)
IMM GRANULOCYTES # BLD: 0 10E3/UL
IMM GRANULOCYTES NFR BLD: 1 %
LYMPHOCYTES # BLD AUTO: 3 10E3/UL (ref 0.8–5.3)
LYMPHOCYTES NFR BLD AUTO: 41 %
MCH RBC QN AUTO: 33.3 PG (ref 26.5–33)
MCHC RBC AUTO-ENTMCNC: 34.8 G/DL (ref 31.5–36.5)
MCV RBC AUTO: 96 FL (ref 78–100)
MONOCYTES # BLD AUTO: 0.6 10E3/UL (ref 0–1.3)
MONOCYTES NFR BLD AUTO: 8 %
NEUTROPHILS # BLD AUTO: 3.5 10E3/UL (ref 1.6–8.3)
NEUTROPHILS NFR BLD AUTO: 46 %
NRBC # BLD AUTO: 0 10E3/UL
NRBC BLD AUTO-RTO: 0 /100
PLATELET # BLD AUTO: 240 10E3/UL (ref 150–450)
POTASSIUM SERPL-SCNC: 3.4 MMOL/L (ref 3.4–5.3)
RBC # BLD AUTO: 4.42 10E6/UL (ref 3.8–5.2)
SODIUM SERPL-SCNC: 140 MMOL/L (ref 135–145)
TROPONIN T SERPL HS-MCNC: <6 NG/L
WBC # BLD AUTO: 7.5 10E3/UL (ref 4–11)

## 2024-04-29 PROCEDURE — 99285 EMERGENCY DEPT VISIT HI MDM: CPT | Mod: 25

## 2024-04-29 PROCEDURE — 93005 ELECTROCARDIOGRAM TRACING: CPT

## 2024-04-29 PROCEDURE — 250N000013 HC RX MED GY IP 250 OP 250 PS 637: Performed by: STUDENT IN AN ORGANIZED HEALTH CARE EDUCATION/TRAINING PROGRAM

## 2024-04-29 PROCEDURE — 85379 FIBRIN DEGRADATION QUANT: CPT | Performed by: STUDENT IN AN ORGANIZED HEALTH CARE EDUCATION/TRAINING PROGRAM

## 2024-04-29 PROCEDURE — 93005 ELECTROCARDIOGRAM TRACING: CPT | Mod: 76

## 2024-04-29 PROCEDURE — 250N000011 HC RX IP 250 OP 636: Performed by: STUDENT IN AN ORGANIZED HEALTH CARE EDUCATION/TRAINING PROGRAM

## 2024-04-29 PROCEDURE — G1010 CDSM STANSON: HCPCS

## 2024-04-29 PROCEDURE — 85025 COMPLETE CBC W/AUTO DIFF WBC: CPT | Performed by: STUDENT IN AN ORGANIZED HEALTH CARE EDUCATION/TRAINING PROGRAM

## 2024-04-29 PROCEDURE — 80048 BASIC METABOLIC PNL TOTAL CA: CPT | Performed by: STUDENT IN AN ORGANIZED HEALTH CARE EDUCATION/TRAINING PROGRAM

## 2024-04-29 PROCEDURE — 36415 COLL VENOUS BLD VENIPUNCTURE: CPT | Performed by: STUDENT IN AN ORGANIZED HEALTH CARE EDUCATION/TRAINING PROGRAM

## 2024-04-29 PROCEDURE — 250N000009 HC RX 250: Performed by: STUDENT IN AN ORGANIZED HEALTH CARE EDUCATION/TRAINING PROGRAM

## 2024-04-29 PROCEDURE — 84484 ASSAY OF TROPONIN QUANT: CPT | Performed by: STUDENT IN AN ORGANIZED HEALTH CARE EDUCATION/TRAINING PROGRAM

## 2024-04-29 PROCEDURE — 96374 THER/PROPH/DIAG INJ IV PUSH: CPT | Mod: 59

## 2024-04-29 RX ORDER — ACETAMINOPHEN 500 MG
1000 TABLET ORAL ONCE
Status: COMPLETED | OUTPATIENT
Start: 2024-04-29 | End: 2024-04-29

## 2024-04-29 RX ORDER — IBUPROFEN 200 MG
400 TABLET ORAL ONCE
Status: COMPLETED | OUTPATIENT
Start: 2024-04-29 | End: 2024-04-29

## 2024-04-29 RX ORDER — IOPAMIDOL 755 MG/ML
120 INJECTION, SOLUTION INTRAVASCULAR ONCE
Status: COMPLETED | OUTPATIENT
Start: 2024-04-29 | End: 2024-04-29

## 2024-04-29 RX ORDER — MORPHINE SULFATE 4 MG/ML
4 INJECTION, SOLUTION INTRAMUSCULAR; INTRAVENOUS ONCE
Status: COMPLETED | OUTPATIENT
Start: 2024-04-29 | End: 2024-04-29

## 2024-04-29 RX ADMIN — MORPHINE SULFATE 4 MG: 4 INJECTION, SOLUTION INTRAMUSCULAR; INTRAVENOUS at 22:36

## 2024-04-29 RX ADMIN — IBUPROFEN 400 MG: 200 TABLET, FILM COATED ORAL at 21:44

## 2024-04-29 RX ADMIN — IOPAMIDOL 65 ML: 755 INJECTION, SOLUTION INTRAVENOUS at 22:59

## 2024-04-29 RX ADMIN — ACETAMINOPHEN 1000 MG: 500 TABLET, FILM COATED ORAL at 21:43

## 2024-04-29 RX ADMIN — SODIUM CHLORIDE 80 ML: 9 INJECTION, SOLUTION INTRAVENOUS at 22:59

## 2024-04-29 ASSESSMENT — ACTIVITIES OF DAILY LIVING (ADL)
ADLS_ACUITY_SCORE: 35
ADLS_ACUITY_SCORE: 35

## 2024-04-29 ASSESSMENT — COLUMBIA-SUICIDE SEVERITY RATING SCALE - C-SSRS
6. HAVE YOU EVER DONE ANYTHING, STARTED TO DO ANYTHING, OR PREPARED TO DO ANYTHING TO END YOUR LIFE?: NO
2. HAVE YOU ACTUALLY HAD ANY THOUGHTS OF KILLING YOURSELF IN THE PAST MONTH?: NO
1. IN THE PAST MONTH, HAVE YOU WISHED YOU WERE DEAD OR WISHED YOU COULD GO TO SLEEP AND NOT WAKE UP?: NO

## 2024-04-29 NOTE — Clinical Note
Tori Humphreys was seen and treated in our emergency department on 4/29/2024.  She may return to work on 05/01/2024.       If you have any questions or concerns, please don't hesitate to call.      Henok Zeng MD

## 2024-04-30 VITALS
HEIGHT: 64 IN | RESPIRATION RATE: 18 BRPM | SYSTOLIC BLOOD PRESSURE: 127 MMHG | TEMPERATURE: 98.1 F | WEIGHT: 171.8 LBS | DIASTOLIC BLOOD PRESSURE: 70 MMHG | BODY MASS INDEX: 29.33 KG/M2 | OXYGEN SATURATION: 98 % | HEART RATE: 78 BPM

## 2024-04-30 LAB
ATRIAL RATE - MUSE: 67 BPM
ATRIAL RATE - MUSE: 79 BPM
DIASTOLIC BLOOD PRESSURE - MUSE: NORMAL MMHG
DIASTOLIC BLOOD PRESSURE - MUSE: NORMAL MMHG
INTERPRETATION ECG - MUSE: NORMAL
INTERPRETATION ECG - MUSE: NORMAL
P AXIS - MUSE: 32 DEGREES
P AXIS - MUSE: 41 DEGREES
PR INTERVAL - MUSE: 160 MS
PR INTERVAL - MUSE: 162 MS
QRS DURATION - MUSE: 80 MS
QRS DURATION - MUSE: 88 MS
QT - MUSE: 352 MS
QT - MUSE: 412 MS
QTC - MUSE: 403 MS
QTC - MUSE: 435 MS
R AXIS - MUSE: 29 DEGREES
R AXIS - MUSE: 30 DEGREES
SYSTOLIC BLOOD PRESSURE - MUSE: NORMAL MMHG
SYSTOLIC BLOOD PRESSURE - MUSE: NORMAL MMHG
T AXIS - MUSE: 21 DEGREES
T AXIS - MUSE: 9 DEGREES
VENTRICULAR RATE- MUSE: 67 BPM
VENTRICULAR RATE- MUSE: 79 BPM

## 2024-04-30 NOTE — ED TRIAGE NOTES
Patient reports 2 weeks of ongoing chest pain. Patient states she was diagnosed with bronchitis but continues to have chest pain. Patient reports her cough is better.       Triage Assessment (Adult)       Row Name 04/29/24 2035          Triage Assessment    Airway WDL WDL        Respiratory WDL    Respiratory WDL WDL        Skin Circulation/Temperature WDL    Skin Circulation/Temperature WDL WDL        Cardiac WDL    Cardiac WDL WDL        Peripheral/Neurovascular WDL    Peripheral Neurovascular WDL WDL        Cognitive/Neuro/Behavioral WDL    Cognitive/Neuro/Behavioral WDL WDL

## 2024-04-30 NOTE — ED PROVIDER NOTES
"  History     Chief Complaint:  Chest Pain       The history is provided by the patient.      Tori Humphreys is a 58 year old female with history of cancer (not active), brain injury, hypothyroidism, and chronic alcohol use who presents to the ED for chest pain.The patient reports she has been sick for the last couple weeks and was diagnosed with bronchitis. She states she had one episode of vomiting this morning. She states the pain is worse with walking around, any movement or twisting and with respirations.  Pain is crampy and sharp. She reports she has been taking ibuprofen without relief. She notes she has a history of a blood clot in her leg that traveled to her lung when she was in her 30s; she adds she was on birth control at that time. She denies recent fever, productive cough, leg swelling, leg pain, smoke use, hemoptysis, hormone use, current treatment for cancer, history of surgeries in the last month, or history of trauma in the last month. Pain improved by lying flat in bed.    No history of hypertension, hyperlipidemia, or diabetes.  No cardiac history.       Independent Historian:    None - Patient Only    Review of External Notes:  none    Allergies:  Amoxicillin-Pot Clavulanate  Aspirin [Dihydroxyaluminum Aminoacetate]  Latex  Wasps [Hornets]     Physical Exam   Patient Vitals for the past 24 hrs:   BP Temp Temp src Pulse Resp SpO2 Height Weight   04/29/24 2036 136/78 98.1  F (36.7  C) Oral 84 18 99 % 1.626 m (5' 4\") 77.9 kg (171 lb 12.8 oz)        Physical Exam  GENERAL: Patient appears uncomfortable, but nontoxic.  HEAD: Atraumatic.  NECK: No rigidity  Chest: Reproducible tenderness to palpation over the left-sided chest-patient winces but there is no skin changes, crepitus, or deformity.  CV: RRR, no murmurs rubs or gallops  PULM: CTAB with good aeration; no retractions, rales, rhonchi, or wheezing  ABD: Soft, nontender, nondistended, no guarding  DERM: No rash. Skin warm and dry  Back: No " midline spinal tenderness or deformity.  Reproducible muscle tenderness in the left rhomboid region - patient winces.  No skin changes, crepitus, or deformity.  EXTREMITY: Moving all extremities without difficulty.  Can range and load left shoulder joint without issue.  No calf tenderness or peripheral edema  VASCULAR: Symmetric pulses bilaterally      Emergency Department Course   ECG  ECG interpreted by me.  Time 2120  NSR at 2032. No ST elevation or depression. Normal intervals. Normal axis.   Narrow Q-wave in lead III      Repeat ECG  ECG interpreted by me.  Time 0033  NSR at 67. No ST elevation or depression. Normal intervals. Normal axis.   Narrow Q-wave in lead III        Laboratory: Imaging:   Labs Ordered and Resulted from Time of ED Arrival to Time of ED Departure   D DIMER QUANTITATIVE - Abnormal       Result Value    D-Dimer Quantitative 0.60 (*)    BASIC METABOLIC PANEL - Abnormal    Sodium 140      Potassium 3.4      Chloride 106      Carbon Dioxide (CO2) 20 (*)     Anion Gap 14      Urea Nitrogen 14.0      Creatinine 0.53      GFR Estimate >90      Calcium 9.4      Glucose 74     CBC WITH PLATELETS AND DIFFERENTIAL - Abnormal    WBC Count 7.5      RBC Count 4.42      Hemoglobin 14.7      Hematocrit 42.2      MCV 96      MCH 33.3 (*)     MCHC 34.8      RDW 12.3      Platelet Count 240      % Neutrophils 46      % Lymphocytes 41      % Monocytes 8      % Eosinophils 3      % Basophils 1      % Immature Granulocytes 1      NRBCs per 100 WBC 0      Absolute Neutrophils 3.5      Absolute Lymphocytes 3.0      Absolute Monocytes 0.6      Absolute Eosinophils 0.2      Absolute Basophils 0.1      Absolute Immature Granulocytes 0.0      Absolute NRBCs 0.0     TROPONIN T, HIGH SENSITIVITY - Normal    Troponin T, High Sensitivity <6       CT Chest Pulmonary Embolism w Contrast   Final Result   IMPRESSION:   1.  No pulmonary artery embolus or other acute process within the chest.   2.  Small sliding-type hiatal  hernia.   3.  Diffuse hepatic steatosis.                 Emergency Department Course & Assessments:             Interventions:  Medications   ibuprofen (ADVIL/MOTRIN) tablet 400 mg (400 mg Oral $Given 4/29/24 2144)   acetaminophen (TYLENOL) tablet 1,000 mg (1,000 mg Oral $Given 4/29/24 2143)   morphine (PF) injection 4 mg (4 mg Intravenous $Given 4/29/24 2236)   iopamidol (ISOVUE-370) solution 120 mL (65 mLs Intravenous $Given 4/29/24 2259)   Saline CT scan flush (80 mLs Intravenous $Given 4/29/24 2259)        Assessments, Independent Interpretation, Consult/Discussion of ManagementTests:  ED Course as of 04/30/24 0052   Mon Apr 29, 2024 2120 I obtained history and examined the patient as noted above.        Social Determinants of Health affecting care:  None    Disposition:  The patient was discharged to home.     Impression & Plan     MIPS (If applicable):  CT for PE was ordered because the patient had an abnormal d-dimer.     Medical Decision Making:  Patient presenting with chest pain.  Appears to be pleurisy versus chest wall in nature.    Differential diagnosis considered but not limited to ACS, PE, vascular dissection, but evaluation not consistent with these etiologies.    Vital signs unremarkable.    ECG independently interpreted -negative for acute ischemia.  Repeat ECG without significant acute change.    No high risk cardiac risk factors.    High-sensitivity troponin negative.  [place] high-sensitivity troponin pathway followed-further cardiac emergent workup not indicated.    D-dimer minimally elevated, thus pursued CTPE which was negative for acute process.    Exam not consistent with dissection.  Symptoms have been present for a couple weeks now and I do not see an obvious emergent concerning medical cause.    Her symptoms are reproducible with palpation over her chest and her back and positional.  It appears she may be having chest wall pain versus pleurisy.  Recommended continued ibuprofen and  Tylenol, Lidoderm patches as needed, stretching and tissue massage.    I have evaluated the patient for acute medical emergencies and have clinically decided no further acute medical interventions are required. Reassessed multiple times and improving. Patient stable for discharge. All questions answered. Given strict return precautions. Additional verbal discharge instructions were given and discussed with the patient. Patient content with plan. The differential diagnosis and treatment modalities were discussed thoroughly with the patient. Recommended PCP follow-up in 2-3 days.      Diagnosis:    ICD-10-CM    1. Chest pain, unspecified type  R07.9            Discharge Medications:  New Prescriptions    No medications on file      Scribe Disclosure:  IAnia, am serving as a scribe at 9:44 PM on 4/29/2024 to document services personally performed by Henok Zeng MD based on my observations and the provider's statements to me.      4/29/2024   Henok Zeng MD Foss, Kevin, MD  04/30/24 0052

## 2025-04-12 ENCOUNTER — HEALTH MAINTENANCE LETTER (OUTPATIENT)
Age: 60
End: 2025-04-12